# Patient Record
Sex: MALE | Race: OTHER | Employment: FULL TIME | URBAN - METROPOLITAN AREA
[De-identification: names, ages, dates, MRNs, and addresses within clinical notes are randomized per-mention and may not be internally consistent; named-entity substitution may affect disease eponyms.]

---

## 2019-01-15 ENCOUNTER — OFFICE VISIT (OUTPATIENT)
Dept: FAMILY MEDICINE CLINIC | Facility: CLINIC | Age: 34
End: 2019-01-15

## 2019-01-15 VITALS
HEIGHT: 68.6 IN | RESPIRATION RATE: 18 BRPM | BODY MASS INDEX: 28.77 KG/M2 | TEMPERATURE: 98 F | SYSTOLIC BLOOD PRESSURE: 128 MMHG | DIASTOLIC BLOOD PRESSURE: 78 MMHG | HEART RATE: 76 BPM | WEIGHT: 192 LBS

## 2019-01-15 DIAGNOSIS — Z00.00 ROUTINE PHYSICAL EXAMINATION: ICD-10-CM

## 2019-01-15 DIAGNOSIS — J06.9 ACUTE URI: ICD-10-CM

## 2019-01-15 DIAGNOSIS — R09.81 NASAL CONGESTION: Primary | ICD-10-CM

## 2019-01-15 PROCEDURE — 99395 PREV VISIT EST AGE 18-39: CPT | Performed by: FAMILY MEDICINE

## 2019-01-15 RX ORDER — AMOXICILLIN 875 MG/1
875 TABLET, COATED ORAL 2 TIMES DAILY
Qty: 20 TABLET | Refills: 0 | Status: SHIPPED | OUTPATIENT
Start: 2019-01-15 | End: 2019-05-30 | Stop reason: ALTCHOICE

## 2019-01-15 NOTE — PROGRESS NOTES
HPI:    Patient ID: Alan Miranda is a 34year old male. Patient is here for routine physical exam. No acute issues. No significant chronic medical problems. Patient is requesting testing. Diet has been fair and exercise is good. Exercises every day. adenopathy. Psychiatric: He has a normal mood and affect. His behavior is normal. Judgment and thought content normal.   Vitals reviewed. ASSESSMENT/PLAN:   Routine physical examination:  - Exam is unremarkable.  Screening tests were discussed

## 2019-01-19 ENCOUNTER — LAB ENCOUNTER (OUTPATIENT)
Dept: LAB | Age: 34
End: 2019-01-19
Attending: FAMILY MEDICINE
Payer: COMMERCIAL

## 2019-01-19 DIAGNOSIS — Z00.00 ROUTINE PHYSICAL EXAMINATION: ICD-10-CM

## 2019-01-19 LAB
ALBUMIN SERPL BCP-MCNC: 4 G/DL (ref 3.5–4.8)
ALBUMIN/GLOB SERPL: 1.1 {RATIO} (ref 1–2)
ALP SERPL-CCNC: 68 U/L (ref 32–100)
ALT SERPL-CCNC: 39 U/L (ref 17–63)
ANION GAP SERPL CALC-SCNC: 12 MMOL/L (ref 0–18)
AST SERPL-CCNC: 32 U/L (ref 15–41)
BACTERIA UR QL AUTO: NEGATIVE /HPF
BASOPHILS # BLD: 0.1 K/UL (ref 0–0.2)
BASOPHILS NFR BLD: 1 %
BILIRUB SERPL-MCNC: 0.6 MG/DL (ref 0.3–1.2)
BILIRUB UR QL: NEGATIVE
BUN SERPL-MCNC: 14 MG/DL (ref 8–20)
BUN/CREAT SERPL: 15.7 (ref 10–20)
CALCIUM SERPL-MCNC: 9.7 MG/DL (ref 8.5–10.5)
CHLORIDE SERPL-SCNC: 105 MMOL/L (ref 95–110)
CHOLEST SERPL-MCNC: 204 MG/DL (ref 110–200)
CLARITY UR: CLEAR
CO2 SERPL-SCNC: 24 MMOL/L (ref 22–32)
COLOR UR: YELLOW
CREAT SERPL-MCNC: 0.89 MG/DL (ref 0.5–1.5)
EOSINOPHIL # BLD: 0.3 K/UL (ref 0–0.7)
EOSINOPHIL NFR BLD: 3 %
ERYTHROCYTE [DISTWIDTH] IN BLOOD BY AUTOMATED COUNT: 13.9 % (ref 11–15)
GLOBULIN PLAS-MCNC: 3.5 G/DL (ref 2.5–3.7)
GLUCOSE SERPL-MCNC: 87 MG/DL (ref 70–99)
GLUCOSE UR-MCNC: NEGATIVE MG/DL
HCT VFR BLD AUTO: 43.2 % (ref 41–52)
HDLC SERPL-MCNC: 40 MG/DL
HGB BLD-MCNC: 14.8 G/DL (ref 13.5–17.5)
HGB UR QL STRIP.AUTO: NEGATIVE
KETONES UR-MCNC: NEGATIVE MG/DL
LDLC SERPL CALC-MCNC: 151 MG/DL (ref 0–99)
LEUKOCYTE ESTERASE UR QL STRIP.AUTO: NEGATIVE
LYMPHOCYTES # BLD: 2 K/UL (ref 1–4)
LYMPHOCYTES NFR BLD: 19 %
MCH RBC QN AUTO: 29.3 PG (ref 27–32)
MCHC RBC AUTO-ENTMCNC: 34.3 G/DL (ref 32–37)
MCV RBC AUTO: 85.5 FL (ref 80–100)
MONOCYTES # BLD: 0.9 K/UL (ref 0–1)
MONOCYTES NFR BLD: 8 %
NEUTROPHILS # BLD AUTO: 7.3 K/UL (ref 1.8–7.7)
NEUTROPHILS NFR BLD: 69 %
NITRITE UR QL STRIP.AUTO: NEGATIVE
NONHDLC SERPL-MCNC: 164 MG/DL
OSMOLALITY UR CALC.SUM OF ELEC: 292 MOSM/KG (ref 275–295)
PATIENT FASTING: YES
PH UR: 5 [PH] (ref 5–8)
PLATELET # BLD AUTO: 314 K/UL (ref 140–400)
PMV BLD AUTO: 7.8 FL (ref 7.4–10.3)
POTASSIUM SERPL-SCNC: 4.5 MMOL/L (ref 3.3–5.1)
PROT SERPL-MCNC: 7.5 G/DL (ref 5.9–8.4)
PROT UR-MCNC: 30 MG/DL
RBC # BLD AUTO: 5.05 M/UL (ref 4.5–5.9)
RBC #/AREA URNS AUTO: 1 /HPF
SODIUM SERPL-SCNC: 141 MMOL/L (ref 136–144)
SP GR UR STRIP: 1.03 (ref 1–1.03)
TRIGL SERPL-MCNC: 63 MG/DL (ref 1–149)
TSH SERPL-ACNC: 0.75 UIU/ML (ref 0.45–5.33)
UROBILINOGEN UR STRIP-ACNC: <2
VIT C UR-MCNC: NEGATIVE MG/DL
WBC # BLD AUTO: 10.7 K/UL (ref 4–11)
WBC #/AREA URNS AUTO: <1 /HPF

## 2019-01-19 PROCEDURE — 84443 ASSAY THYROID STIM HORMONE: CPT

## 2019-01-19 PROCEDURE — 80061 LIPID PANEL: CPT

## 2019-01-19 PROCEDURE — 85025 COMPLETE CBC W/AUTO DIFF WBC: CPT

## 2019-01-19 PROCEDURE — 36415 COLL VENOUS BLD VENIPUNCTURE: CPT

## 2019-01-19 PROCEDURE — 80053 COMPREHEN METABOLIC PANEL: CPT

## 2019-01-19 PROCEDURE — 81001 URINALYSIS AUTO W/SCOPE: CPT

## 2019-01-22 ENCOUNTER — TELEPHONE (OUTPATIENT)
Dept: FAMILY MEDICINE CLINIC | Facility: CLINIC | Age: 34
End: 2019-01-22

## 2019-01-22 DIAGNOSIS — R82.90 ABNORMAL URINALYSIS: ICD-10-CM

## 2019-01-22 DIAGNOSIS — E78.00 ELEVATED CHOLESTEROL: Primary | ICD-10-CM

## 2019-01-22 NOTE — TELEPHONE ENCOUNTER
Dr Rowan Thompson, please advise. Patient had two concerns about lab results:    1. Why is there protein in the urine? 2. Discussed the lipid panel results. Discussed basics of low-fat, low-cholesterol diet, monitoring his intake if eating out.  Advised on exerc

## 2019-01-22 NOTE — TELEPHONE ENCOUNTER
Message noted; pt can have some protein in urine after exercising recently or with diet if they have been having sig protein in diet recently. Some times protein can be suggestive of issues with the kidneys. His kidney testing was fine with blood work.

## 2019-03-09 ENCOUNTER — OFFICE VISIT (OUTPATIENT)
Dept: OTOLARYNGOLOGY | Facility: CLINIC | Age: 34
End: 2019-03-09

## 2019-03-09 VITALS
SYSTOLIC BLOOD PRESSURE: 135 MMHG | TEMPERATURE: 99 F | WEIGHT: 190 LBS | HEIGHT: 68 IN | DIASTOLIC BLOOD PRESSURE: 86 MMHG | BODY MASS INDEX: 28.79 KG/M2

## 2019-03-09 DIAGNOSIS — J34.2 DEVIATED NASAL SEPTUM: Primary | ICD-10-CM

## 2019-03-09 PROCEDURE — 99212 OFFICE O/P EST SF 10 MIN: CPT | Performed by: OTOLARYNGOLOGY

## 2019-03-09 PROCEDURE — 99243 OFF/OP CNSLTJ NEW/EST LOW 30: CPT | Performed by: OTOLARYNGOLOGY

## 2019-03-09 RX ORDER — MOMETASONE 50 UG/1
2 SPRAY, METERED NASAL DAILY
COMMUNITY
End: 2019-06-20 | Stop reason: ALTCHOICE

## 2019-03-09 RX ORDER — AZELASTINE 1 MG/ML
2 SPRAY, METERED NASAL 2 TIMES DAILY
Qty: 1 BOTTLE | Refills: 0 | Status: SHIPPED | OUTPATIENT
Start: 2019-03-09 | End: 2019-04-05

## 2019-03-09 RX ORDER — MONTELUKAST SODIUM 10 MG/1
10 TABLET ORAL NIGHTLY
Qty: 30 TABLET | Refills: 3 | Status: SHIPPED | OUTPATIENT
Start: 2019-03-09 | End: 2019-06-20 | Stop reason: ALTCHOICE

## 2019-03-09 RX ORDER — LORATADINE 10 MG/1
10 TABLET ORAL DAILY
COMMUNITY
End: 2019-05-30 | Stop reason: ALTCHOICE

## 2019-03-09 NOTE — PROGRESS NOTES
Alan Miranda is a 30year old male.   Patient presents with:  Nose Problem: nasal congestion for at least 10 years, pt had a nasal injury in the past   Snoring: pt is a mouth breather       HISTORY OF PRESENT ILLNESS    He presents with a 10-year history Normal Orientation - Oriented to time, place, person & situation. Appropriate mood and affect.    Neck Exam Normal Inspection - Normal. Palpation - Normal. Parotid gland - Normal. Thyroid gland - Normal.   Eyes Normal Conjunctiva - Right: Normal, Left: Norm obstruction. He does have some mucosal congestion. Start Singulair loratadine D and Astelin nasal spray and return to see me in 1 month. If no better he will most likely require CT scan to evaluate his nasal and paranasal anatomy. Albaro Steven.  [place]

## 2019-04-05 RX ORDER — AZELASTINE HYDROCHLORIDE 137 UG/1
SPRAY, METERED NASAL
Qty: 1 BOTTLE | Refills: 0 | Status: SHIPPED | OUTPATIENT
Start: 2019-04-05 | End: 2019-06-20 | Stop reason: ALTCHOICE

## 2019-04-06 ENCOUNTER — OFFICE VISIT (OUTPATIENT)
Dept: OTOLARYNGOLOGY | Facility: CLINIC | Age: 34
End: 2019-04-06

## 2019-04-06 VITALS
DIASTOLIC BLOOD PRESSURE: 88 MMHG | SYSTOLIC BLOOD PRESSURE: 135 MMHG | BODY MASS INDEX: 29 KG/M2 | TEMPERATURE: 98 F | WEIGHT: 188 LBS

## 2019-04-06 DIAGNOSIS — J34.2 DEVIATED NASAL SEPTUM: Primary | ICD-10-CM

## 2019-04-06 DIAGNOSIS — J34.89 NASAL OBSTRUCTION: ICD-10-CM

## 2019-04-06 PROCEDURE — 99212 OFFICE O/P EST SF 10 MIN: CPT | Performed by: OTOLARYNGOLOGY

## 2019-04-06 PROCEDURE — 99214 OFFICE O/P EST MOD 30 MIN: CPT | Performed by: OTOLARYNGOLOGY

## 2019-04-06 RX ORDER — AZELASTINE 1 MG/ML
2 SPRAY, METERED NASAL 2 TIMES DAILY
Qty: 1 BOTTLE | Refills: 3 | Status: SHIPPED | OUTPATIENT
Start: 2019-04-06 | End: 2019-06-14

## 2019-04-16 ENCOUNTER — HOSPITAL ENCOUNTER (OUTPATIENT)
Dept: CT IMAGING | Facility: HOSPITAL | Age: 34
Discharge: HOME | End: 2019-04-16
Attending: OTOLARYNGOLOGY
Payer: COMMERCIAL

## 2019-04-16 DIAGNOSIS — J34.89 NASAL OBSTRUCTION: ICD-10-CM

## 2019-04-16 PROCEDURE — 70486 CT MAXILLOFACIAL W/O DYE: CPT | Performed by: OTOLARYNGOLOGY

## 2019-04-18 ENCOUNTER — OFFICE VISIT (OUTPATIENT)
Dept: OTOLARYNGOLOGY | Facility: CLINIC | Age: 34
End: 2019-04-18

## 2019-04-18 VITALS
TEMPERATURE: 98 F | DIASTOLIC BLOOD PRESSURE: 90 MMHG | BODY MASS INDEX: 28.49 KG/M2 | WEIGHT: 188 LBS | SYSTOLIC BLOOD PRESSURE: 140 MMHG | HEIGHT: 68 IN

## 2019-04-18 DIAGNOSIS — J34.2 DEVIATED NASAL SEPTUM: Primary | ICD-10-CM

## 2019-04-18 PROCEDURE — 99214 OFFICE O/P EST MOD 30 MIN: CPT | Performed by: OTOLARYNGOLOGY

## 2019-04-18 PROCEDURE — 99212 OFFICE O/P EST SF 10 MIN: CPT | Performed by: OTOLARYNGOLOGY

## 2019-04-18 NOTE — PROGRESS NOTES
Audrey Maria is a 30year old male. Patient presents with:  Results: ct scan done on 4/16/19       HISTORY OF PRESENT ILLNESS  He presents with a 10-year history of chronic nasal obstruction.  He is a chronic mouth breather and states that he snores.    Negative Drooling. Eyes Negative Blurred vision and vision changes. Respiratory Negative Dyspnea and wheezing. Cardio Negative Chest pain, irregular heartbeat/palpitations and syncope. GI Negative Abdominal pain and diarrhea.    Endocrine Negative C hypertrophy left: Mild hypertrophy       Current Outpatient Medications:   •  Azelastine HCl 0.1 % Nasal Solution, 2 sprays by Nasal route 2 (two) times daily. , Disp: 1 Bottle, Rfl: 3  •  AZELASTINE  MCG/SPRAY Nasal Solution, 2 SPRAYS BY NASAL ROUTE

## 2019-04-22 ENCOUNTER — TELEPHONE (OUTPATIENT)
Dept: OTOLARYNGOLOGY | Facility: CLINIC | Age: 34
End: 2019-04-22

## 2019-04-22 NOTE — TELEPHONE ENCOUNTER
Pt calling in regards to deviated septum sx discussed at last office visit. Pt asking about sx schedule and cost of sx.

## 2019-04-22 NOTE — TELEPHONE ENCOUNTER
Pt contacted, scheduled surgery for 5/22/19 with Dr. Melanie Muhammad. Pre-post op instructions reviewed.

## 2019-05-22 ENCOUNTER — TELEPHONE (OUTPATIENT)
Dept: OTOLARYNGOLOGY | Facility: CLINIC | Age: 34
End: 2019-05-22

## 2019-05-22 NOTE — TELEPHONE ENCOUNTER
Received a call from patient's wife asking on how to change patent's mustache dressing, advised patient wife that she can only change it as needed and if saturated 4 dressing within 1 hour to call our office,asking if she can clean patient nose  With dried

## 2019-05-22 NOTE — TELEPHONE ENCOUNTER
Wife wants to know how to clean and change the gauze at the surgery site? Wife states that when she takes the gauze off, it is causing bleeding in the nose area, so she wants to know if there is a better way to change the gauze?  Wife wants to know how can

## 2019-05-23 NOTE — TELEPHONE ENCOUNTER
Pt is post op day 1 septoplasty, SMR. Per  Pt pt is doing well no bleeding, advised pt no bending or heavy lifting for the next week and pt is not to blow nose until seen by VERONICA.  Advised pt she can start using OTC saline nasal spray daily, afrin up to 5 da

## 2019-05-30 ENCOUNTER — OFFICE VISIT (OUTPATIENT)
Dept: OTOLARYNGOLOGY | Facility: CLINIC | Age: 34
End: 2019-05-30

## 2019-05-30 VITALS
SYSTOLIC BLOOD PRESSURE: 106 MMHG | WEIGHT: 190 LBS | HEIGHT: 69 IN | BODY MASS INDEX: 28.14 KG/M2 | DIASTOLIC BLOOD PRESSURE: 70 MMHG | TEMPERATURE: 98 F

## 2019-05-30 DIAGNOSIS — J34.2 DEVIATED NASAL SEPTUM: Primary | ICD-10-CM

## 2019-05-30 PROCEDURE — 99212 OFFICE O/P EST SF 10 MIN: CPT | Performed by: OTOLARYNGOLOGY

## 2019-05-30 PROCEDURE — 99024 POSTOP FOLLOW-UP VISIT: CPT | Performed by: OTOLARYNGOLOGY

## 2019-05-30 RX ORDER — CEPHALEXIN 500 MG/1
CAPSULE ORAL
Refills: 0 | COMMUNITY
Start: 2019-05-22 | End: 2019-06-14

## 2019-05-30 NOTE — PROGRESS NOTES
Calvin Klinefelter is a 30year old male.   Patient presents with:  Post-Op: septoplasty ,patient complains of Migraine and congestion since the procedure       HISTORY OF PRESENT ILLNESS  He presents with a 10-year history of chronic nasal obstruction.  He is No pertinent past medical history. History reviewed. No pertinent surgical history. REVIEW OF SYSTEMS    System Neg/Pos Details   Constitutional Negative Fatigue, fever and weight loss. ENMT Negative Drooling.    Eyes Negative Blurred vision and v Normal Nares - Right: Normal Left: Normal. Septum -Normal  Turbinates - Right: Normal, Left: Normal.       Current Outpatient Medications:   •  cephALEXin 500 MG Oral Cap, TAKE 1 CAPSULE BY MOUTH THREE TIMES A DAY, Disp: , Rfl: 0  •  Azelastine HCl 0.1 % N

## 2019-06-12 ENCOUNTER — TELEPHONE (OUTPATIENT)
Dept: OTOLARYNGOLOGY | Facility: CLINIC | Age: 34
End: 2019-06-12

## 2019-06-12 NOTE — TELEPHONE ENCOUNTER
Pt states he wants to speak to RN re: symptoms since 5/22 sx, only wants to discuss further with RN. Pls call thank you. Aware office is closed for the day.

## 2019-06-13 ENCOUNTER — NURSE TRIAGE (OUTPATIENT)
Dept: FAMILY MEDICINE CLINIC | Facility: CLINIC | Age: 34
End: 2019-06-13

## 2019-06-13 NOTE — TELEPHONE ENCOUNTER
pt informed of your recommendations, states he was taking allegra daily prior to when symptoms started and stopped it because he thought it might cause.  Pt states was searching on the web and asking if his symptoms could be related to empty nose

## 2019-06-13 NOTE — TELEPHONE ENCOUNTER
Action Requested: Summary for Provider     []  Critical Lab, Recommendations Needed  [] Need Additional Advice  []   FYI    []   Need Orders  [] Need Medications Sent to Pharmacy  []  Other     SUMMARY: Appt scheduled for 6/14/19 8am with MILAD MILLER, for conc

## 2019-06-13 NOTE — TELEPHONE ENCOUNTER
Per pt since last Friday, daily, pt feels at times he is gasping for air, causing increased anxiety and panic attacks.  Per [facility], symptoms are unlikely due to surgery, pt to Singulair, Astelin and Claritin D, if symptoms do not improve pt to be follow up wit

## 2019-06-14 ENCOUNTER — TELEPHONE (OUTPATIENT)
Dept: OTOLARYNGOLOGY | Facility: CLINIC | Age: 34
End: 2019-06-14

## 2019-06-14 ENCOUNTER — LAB ENCOUNTER (OUTPATIENT)
Dept: LAB | Age: 34
End: 2019-06-14
Attending: PHYSICIAN ASSISTANT
Payer: COMMERCIAL

## 2019-06-14 ENCOUNTER — HOSPITAL ENCOUNTER (EMERGENCY)
Facility: HOSPITAL | Age: 34
Discharge: HOME | End: 2019-06-14
Attending: PHYSICIAN ASSISTANT
Payer: COMMERCIAL

## 2019-06-14 VITALS
RESPIRATION RATE: 17 BRPM | TEMPERATURE: 99 F | WEIGHT: 185 LBS | SYSTOLIC BLOOD PRESSURE: 132 MMHG | BODY MASS INDEX: 27 KG/M2 | DIASTOLIC BLOOD PRESSURE: 88 MMHG | OXYGEN SATURATION: 97 % | HEART RATE: 76 BPM

## 2019-06-14 DIAGNOSIS — R03.0 ELEVATED BLOOD PRESSURE READING: ICD-10-CM

## 2019-06-14 DIAGNOSIS — R04.0 EPISTAXIS: Primary | ICD-10-CM

## 2019-06-14 DIAGNOSIS — Z98.890 S/P NASAL SEPTOPLASTY: ICD-10-CM

## 2019-06-14 DIAGNOSIS — E78.00 ELEVATED CHOLESTEROL: ICD-10-CM

## 2019-06-14 DIAGNOSIS — R82.90 ABNORMAL URINALYSIS: ICD-10-CM

## 2019-06-14 DIAGNOSIS — F41.9 ANXIETY: ICD-10-CM

## 2019-06-14 PROCEDURE — 82306 VITAMIN D 25 HYDROXY: CPT

## 2019-06-14 PROCEDURE — 85025 COMPLETE CBC W/AUTO DIFF WBC: CPT

## 2019-06-14 PROCEDURE — 80061 LIPID PANEL: CPT

## 2019-06-14 PROCEDURE — 36415 COLL VENOUS BLD VENIPUNCTURE: CPT

## 2019-06-14 PROCEDURE — 84443 ASSAY THYROID STIM HORMONE: CPT

## 2019-06-14 PROCEDURE — 81001 URINALYSIS AUTO W/SCOPE: CPT

## 2019-06-14 PROCEDURE — 99282 EMERGENCY DEPT VISIT SF MDM: CPT

## 2019-06-14 PROCEDURE — 80053 COMPREHEN METABOLIC PANEL: CPT

## 2019-06-14 NOTE — TELEPHONE ENCOUNTER
Dr Octaviano Haddad patient stated  that he had episode of nosebleed  Left nostril profusely today after he blew his nose and was able to stopped the bleeding. Advised patient  To continue saline spray,Vaseline to keep his nose moistand if bleeding will occur again a

## 2019-06-14 NOTE — PROGRESS NOTES
HPI:    Patient ID: Tim Perales is a 30year old male. Patient has been having anxiety attacks for the past 1 week. He was sitting at lunch and felt a jolt of anxiety and his hands got clammy. He has had this uneasy feeling throughout this week.  Jennifer Galan (10 mg total) by mouth nightly.  Disp: 30 tablet Rfl: 3     Allergies:No Known Allergies   /84 (BP Location: Left arm, Patient Position: Sitting)   Pulse 80   Temp 97.8 °F (36.6 °C) (Oral)   Ht 5' 9\" (1.753 m)   Wt 185 lb (83.9 kg)   BMI 27.32 kg/m² worsening symptoms.   -Pt was agreeable to plan and will comply with discussion above. - CBC WITH DIFFERENTIAL WITH PLATELET; Future  - COMP METABOLIC PANEL (14);  Future  - ASSAY, THYROID STIM HORMONE; Future  - VITAMIN D, 25-HYDROXY; Future  - EKG 12-

## 2019-06-14 NOTE — ED NOTES
Pt reports septoplasty on 5/22. Today, patient blew nose and \" a piece of plastic came out of my L nostril and my nose began to bleed. \" Pt states bleeding stopped on its own. No bleeding noted at this time.

## 2019-06-14 NOTE — TELEPHONE ENCOUNTER
Pt states he blew his nose and something came out - then his nose started bleeding profusely - bleeding has stopped now - pls advise

## 2019-06-14 NOTE — ED INITIAL ASSESSMENT (HPI)
Septoplasty on may 22, stated he blew nose and something hard came out and nose began to bleed for 10 minutes.  Bleeding controlled

## 2019-06-14 NOTE — TELEPHONE ENCOUNTER
No this is not empty nose syndrome empty nose syndrome occurs when the turbinates are removed completely. He does not have this and I suspect that his gasping has more to do with anxiety than anything else.   He may return to clinic if he wishes to be reev

## 2019-06-14 NOTE — ED PROVIDER NOTES
Patient Seen in: [facility] Emergency Department    History   Patient presents with:  Nose Bleed (nasopharyngeal)    Stated Complaint: septoplasty on 5/22/2019, blew nose today and blood and \"something hard came ou*    HPI    42-year-old male pre except as otherwise stated in HPI.     Physical Exam     ED Triage Vitals [06/14/19 1552]   /84   Pulse 67   Resp 18   Temp 98.8 °F (37.1 °C)   Temp src Oral   SpO2 100 %   O2 Device None (Room air)       Current:/88   Pulse 76   Temp 98.8 °F (3 discussed with patient. The patient was informed of their elevated blood pressure reading in the Emergency Department. They were informed of the dangers of undiagnosed and untreated hypertension.   Education regarding lifestyle modifications and the nee

## 2019-06-15 ENCOUNTER — TELEPHONE (OUTPATIENT)
Dept: FAMILY MEDICINE CLINIC | Facility: CLINIC | Age: 34
End: 2019-06-15

## 2019-06-15 NOTE — TELEPHONE ENCOUNTER
He most likely blew out a scab from his nose. Most likely from the inferior turbinate on the side that bled.   At this point he should be careful with blowing his nose so hard and any further scabs will simply follow out if he continues to use saline and V

## 2019-06-15 NOTE — TELEPHONE ENCOUNTER
Pt called in stating that during his OV yesterday with PAUL Bergman they discussed medications for anxiety and she was going to prescribe. Pt states that he has not received medication and is currently still having sx. Please advise.

## 2019-06-15 NOTE — TELEPHONE ENCOUNTER
Spoke with pt and he states he had an impression yesterday [facility] (low dose) to try for two weeks. Pt would like to start taking the medication since he still 'feeling the same with anxiety\". Chart reviewed and noted no RX sent and Maria Luisa Pino is not available at this time. Dr Prakash Fisher, please advise.

## 2019-06-15 NOTE — TELEPHONE ENCOUNTER
Message noted. I have called pt and left a message that I called in the prescription for xanax 0.5 mg once daily as needed for anxiety. He was given 15 pills with 0 refills. Called prescription to [facility] pharmacy on [id number] in [place]. I advised pt to call pharmacy in 1 hr to see if the prescription will be ready. Told pharmacist to notify pt as well when the prescription is ready.

## 2019-06-17 NOTE — TELEPHONE ENCOUNTER
Rn spoke to patient stated he is doing fine no bleeding,advised pt of note below pt verbalized  Understanding.

## 2019-06-18 ENCOUNTER — TELEPHONE (OUTPATIENT)
Dept: FAMILY MEDICINE CLINIC | Facility: CLINIC | Age: 34
End: 2019-06-18

## 2019-06-18 DIAGNOSIS — R31.1 BENIGN ESSENTIAL MICROSCOPIC HEMATURIA: Primary | ICD-10-CM

## 2019-06-20 ENCOUNTER — TELEPHONE (OUTPATIENT)
Dept: FAMILY MEDICINE CLINIC | Facility: CLINIC | Age: 34
End: 2019-06-20

## 2019-06-20 RX ORDER — ALPRAZOLAM 0.5 MG/1
0.5 TABLET ORAL DAILY PRN
Qty: 15 TABLET | Refills: 0 | COMMUNITY
Start: 2019-06-20

## 2019-06-20 NOTE — PROGRESS NOTES
HPI:    Patient ID: Venecia Morrison is a 30year old male. Patient presents for follow-up on his anxiety. He has been taking the Xanax 1/2 tabs of 0.5 mg as needed and has provided some relief.  Yesterday he felt a sense of sadness and was on verge of te rhythm and normal heart sounds. Pulmonary/Chest: Effort normal and breath sounds normal. He has no wheezes. He has no rales. Neurological: He is alert and oriented to person, place, and time.    Psychiatric: His behavior is normal. Judgment and thought

## 2019-06-20 NOTE — TELEPHONE ENCOUNTER
Spoke with patient and pt is getting worse.  After discussion, will have pt see SHADY Baugh today at 4:45pm.

## 2019-06-20 NOTE — TELEPHONE ENCOUNTER
Called spoke with pt in regards to message notified pt could come in today will have on schedule pt states will be on way right now

## 2019-06-20 NOTE — PATIENT INSTRUCTIONS
Take zoloft daily 25 mg   If you want to stop the medicaiton then call and let me know so we can wean you off the medication. You will notice a difference in 2 weeks, and then a full effect in 6-8 weeks.      Counseling with Judy Pride

## 2019-06-20 NOTE — TELEPHONE ENCOUNTER
Pt states [facility] had called him on 6/18/19 and he discussed his anxiety with him.  Pt states had been feeling better but yesterday starting feeling anxiety with the SOB again and when got home from work started feeling an overwhelming feeling of sadness that h

## 2019-06-21 ENCOUNTER — TELEPHONE (OUTPATIENT)
Dept: OTOLARYNGOLOGY | Facility: CLINIC | Age: 34
End: 2019-06-21

## 2019-06-21 NOTE — TELEPHONE ENCOUNTER
Pt had surgery over 1 month ago, advised pharmacy that [facility] prescriptions cannot be filled as it was ment for pt after surgery. Per pharmacy they will not fill rx for tramadol and will inform pt he will need to contact our office.

## 2019-06-21 NOTE — TELEPHONE ENCOUNTER
Pharmacy calling states pt was precribed tramadol by sx center and printed rx is missing the strength. Pharmacy states they called sx center and was instructed to contact . Pls advise, thank you.

## 2019-06-26 RX ORDER — ALPRAZOLAM 0.5 MG/1
0.5 TABLET ORAL DAILY PRN
Qty: 15 TABLET | Refills: 0 | OUTPATIENT
Start: 2019-06-26

## 2019-06-26 NOTE — TELEPHONE ENCOUNTER
Refill noted. Patient needs to see Michael Gould before he can further refills. Seems like he is going through the medication too fast. Ensure that the zoloft is being taken daily.

## 2019-06-26 NOTE — TELEPHONE ENCOUNTER
Called [facility] pharmacy, spoke with Jae Valera, patient has no refills on file and it is 4 days too  early to be filled again     Med pended for your approval, needs to be called in

## 2019-06-27 NOTE — TELEPHONE ENCOUNTER
Message noted. It might be a side effect of the medication or worsening of his anxiety. Patient needs to keep appointment with Brennan Gonzalez. She will discuss these issues with him further tomorrow.

## 2019-06-27 NOTE — TELEPHONE ENCOUNTER
Rx phoned in. Informed pt Chapin MILLER Instructions. Pt verbalized understanding. Pt report feeling cloudiness and little confusion. States continue with anxiousness.   Pt states he has been taking sertraline x 5 days and would like to know if this is a si

## 2019-07-11 ENCOUNTER — OFFICE VISIT (OUTPATIENT)
Dept: OTOLARYNGOLOGY | Facility: CLINIC | Age: 34
End: 2019-07-11

## 2019-07-11 VITALS
HEIGHT: 69 IN | DIASTOLIC BLOOD PRESSURE: 87 MMHG | BODY MASS INDEX: 25.92 KG/M2 | WEIGHT: 175 LBS | TEMPERATURE: 99 F | SYSTOLIC BLOOD PRESSURE: 126 MMHG

## 2019-07-11 DIAGNOSIS — J34.89 NASAL OBSTRUCTION: Primary | ICD-10-CM

## 2019-07-11 PROCEDURE — 99024 POSTOP FOLLOW-UP VISIT: CPT | Performed by: OTOLARYNGOLOGY

## 2019-07-12 NOTE — PROGRESS NOTES
Maria Alejandra Mcginnis is a 30year old male. Patient presents with:   Follow - Up: pt here for a follw up on septoplasty done on 5/22/19,  pt states a little nasal congestion , not able to sleep       HISTORY OF PRESENT ILLNESS  He presents with a 10-year histor sensation of not being able to breathe.   He states that since that time that each day seems worse and that he feels that he cannot breathe through his nose and developed shortness of breath and states that this is not necessarily ameliorated by opening his Constitutional Negative Fatigue, fever and weight loss. ENMT Negative Drooling. Eyes Negative Blurred vision and vision changes. Respiratory Negative Dyspnea and wheezing. Cardio Negative Chest pain, irregular heartbeat/palpitations and syncope. - Right: Normal, Left: Normal.  Normal mucosa noted throughout. Some dryness to the mucosa primarily on the left side.        Current Outpatient Medications:   •  ALPRAZolam (XANAX) 0.5 MG Oral Tab, Take 1 tablet (0.5 mg total) by mouth daily as needed for months. He states understanding          Robert Barber MD    7/11/2019    7:26 PM

## 2019-07-18 NOTE — TELEPHONE ENCOUNTER
Refill Protocol Appointment Criteria  · Appointment scheduled in the past 6 months or in the next 3 months  Recent Outpatient Visits            1 week ago Nasal obstruction    [facility], [address], MD Yuri Couch

## 2019-07-18 NOTE — TELEPHONE ENCOUNTER
Please advise to refill request.  This is a new med started 6/20/19 and pt was instructed to f/u in one month. No f/u noted.

## 2019-07-18 NOTE — TELEPHONE ENCOUNTER
Message noted. Once he creates a follow-up appointment he can get his refill. He needs to be seen in office for his refills.

## 2019-07-25 RX ORDER — SERTRALINE HYDROCHLORIDE 25 MG/1
25 TABLET, FILM COATED ORAL DAILY
Qty: 90 TABLET | Refills: 1 | OUTPATIENT
Start: 2019-07-25

## 2019-07-25 NOTE — TELEPHONE ENCOUNTER
Pt returning call and states he already has just got the refill ( 28 tabs left ) but he wants to know if he needs to follow up with Dr Atiya Troy or psychiatrist. He reports he was seen by a Psychiatrist from [facility] 2 weeks ago.  He was told to ca

## 2019-08-17 RX ORDER — SERTRALINE HYDROCHLORIDE 25 MG/1
TABLET, FILM COATED ORAL
Qty: 90 TABLET | Refills: 1 | Status: SHIPPED | OUTPATIENT
Start: 2019-08-17

## 2019-08-18 NOTE — TELEPHONE ENCOUNTER
CSS=please call and assists with FU OV, no schedule yet. MyChart message sent. LOV 6/20/19  Instructions         Return in about 1 month (around 7/20/2019). Refill passed per [facility] protocol.     Refill Protocol Appointment Criteria  · Ap

## 2019-08-21 NOTE — TELEPHONE ENCOUNTER
Pt was informed, pt states he is seeing a psychiatrist and refills were given there. Pt also states his does was changed to 50MG. . inform pt he will still need to f/u with  To inform him of changes

## 2020-11-18 ENCOUNTER — OFFICE VISIT (OUTPATIENT)
Dept: FAMILY MEDICINE CLINIC | Facility: CLINIC | Age: 35
End: 2020-11-18

## 2020-11-18 VITALS
DIASTOLIC BLOOD PRESSURE: 72 MMHG | SYSTOLIC BLOOD PRESSURE: 112 MMHG | RESPIRATION RATE: 20 BRPM | TEMPERATURE: 97 F | BODY MASS INDEX: 28.17 KG/M2 | WEIGHT: 188 LBS | HEIGHT: 68.7 IN | HEART RATE: 70 BPM

## 2020-11-18 DIAGNOSIS — Z00.00 ROUTINE PHYSICAL EXAMINATION: Primary | ICD-10-CM

## 2020-11-18 PROCEDURE — 3074F SYST BP LT 130 MM HG: CPT | Performed by: FAMILY MEDICINE

## 2020-11-18 PROCEDURE — 90471 IMMUNIZATION ADMIN: CPT | Performed by: FAMILY MEDICINE

## 2020-11-18 PROCEDURE — 90686 IIV4 VACC NO PRSV 0.5 ML IM: CPT | Performed by: FAMILY MEDICINE

## 2020-11-18 PROCEDURE — 99395 PREV VISIT EST AGE 18-39: CPT | Performed by: FAMILY MEDICINE

## 2020-11-18 PROCEDURE — 3008F BODY MASS INDEX DOCD: CPT | Performed by: FAMILY MEDICINE

## 2020-11-18 PROCEDURE — 3078F DIAST BP <80 MM HG: CPT | Performed by: FAMILY MEDICINE

## 2020-11-18 NOTE — PROGRESS NOTES
HPI:    Patient ID: Ayaka Donnelly is a 37year old male. Patient is here for routine physical exam. No acute issues. No significant chronic medical problems. Patient is requesting testing. Diet and exercise have been fairly good during pandemic.  Past motion. Neck supple. No thyromegaly present. Cardiovascular: Normal rate, regular rhythm, normal heart sounds and intact distal pulses. Pulmonary/Chest: Effort normal and breath sounds normal. No respiratory distress. He has no wheezes.    Abdominal: So

## 2020-11-20 ENCOUNTER — LAB ENCOUNTER (OUTPATIENT)
Dept: LAB | Age: 35
End: 2020-11-20
Attending: FAMILY MEDICINE
Payer: COMMERCIAL

## 2020-11-20 DIAGNOSIS — Z00.00 ROUTINE PHYSICAL EXAMINATION: ICD-10-CM

## 2020-11-20 PROCEDURE — 84443 ASSAY THYROID STIM HORMONE: CPT

## 2020-11-20 PROCEDURE — 85027 COMPLETE CBC AUTOMATED: CPT

## 2020-11-20 PROCEDURE — 80061 LIPID PANEL: CPT

## 2020-11-20 PROCEDURE — 36415 COLL VENOUS BLD VENIPUNCTURE: CPT

## 2020-11-20 PROCEDURE — 80053 COMPREHEN METABOLIC PANEL: CPT

## 2021-04-09 DIAGNOSIS — Z23 NEED FOR VACCINATION: ICD-10-CM

## 2021-04-13 ENCOUNTER — IMMUNIZATION (OUTPATIENT)
Dept: LAB | Age: 36
End: 2021-04-13
Attending: HOSPITALIST
Payer: COMMERCIAL

## 2021-04-13 DIAGNOSIS — Z23 NEED FOR VACCINATION: Primary | ICD-10-CM

## 2021-04-13 PROCEDURE — 0001A SARSCOV2 VAC 30MCG/0.3ML IM: CPT

## 2021-05-04 ENCOUNTER — IMMUNIZATION (OUTPATIENT)
Dept: LAB | Age: 36
End: 2021-05-04
Attending: HOSPITALIST
Payer: COMMERCIAL

## 2021-05-04 DIAGNOSIS — Z23 NEED FOR VACCINATION: Primary | ICD-10-CM

## 2021-05-04 PROCEDURE — 0002A SARSCOV2 VAC 30MCG/0.3ML IM: CPT

## 2021-06-07 ENCOUNTER — NURSE TRIAGE (OUTPATIENT)
Dept: FAMILY MEDICINE CLINIC | Facility: CLINIC | Age: 36
End: 2021-06-07

## 2021-06-07 ENCOUNTER — HOSPITAL ENCOUNTER (OUTPATIENT)
Age: 36
Discharge: HOME | End: 2021-06-07
Payer: COMMERCIAL

## 2021-06-07 VITALS
OXYGEN SATURATION: 100 % | HEART RATE: 65 BPM | BODY MASS INDEX: 27.4 KG/M2 | SYSTOLIC BLOOD PRESSURE: 139 MMHG | DIASTOLIC BLOOD PRESSURE: 97 MMHG | RESPIRATION RATE: 18 BRPM | WEIGHT: 185 LBS | TEMPERATURE: 98 F | HEIGHT: 69 IN

## 2021-06-07 DIAGNOSIS — J02.0 STREPTOCOCCAL SORE THROAT: Primary | ICD-10-CM

## 2021-06-07 PROCEDURE — 87880 STREP A ASSAY W/OPTIC: CPT | Performed by: NURSE PRACTITIONER

## 2021-06-07 PROCEDURE — 99213 OFFICE O/P EST LOW 20 MIN: CPT | Performed by: NURSE PRACTITIONER

## 2021-06-07 RX ORDER — AMOXICILLIN 875 MG/1
875 TABLET, COATED ORAL 2 TIMES DAILY
Qty: 20 TABLET | Refills: 0 | Status: SHIPPED | OUTPATIENT
Start: 2021-06-07 | End: 2021-06-17

## 2021-06-07 NOTE — ED PROVIDER NOTES
Patient presents with:  Ear Pain      HPI:     Ayaka Donnelly is a 31year old male who presents for a sore throat that started a couple days ago. He states he had some upper respiratory congestion prior to the sore throat.   His kids have all had congest Communication with Friends and Family:       Frequency of Social Gatherings with Friends and Family:       Attends Christianity Services:       Active Member of Clubs or Organizations:       Attends Club or Organization Meetings:       Marital Status:   Intim doctor. Diagnosis:    ICD-10-CM    1. Streptococcal sore throat  J02.0        All results reviewed and discussed with patient. See AVS for detailed discharge instructions for your condition today. Follow Up with:  Serena Brooke MD  [address]

## 2021-06-07 NOTE — TELEPHONE ENCOUNTER
Action Requested: Summary for Provider     []  Critical Lab, Recommendations Needed  [] Need Additional Advice  [x]   FYI    []   Need Orders  [] Need Medications Sent to Pharmacy  []  Other     SUMMARY: Patient states for past 2 days he has been having a

## 2021-08-14 ENCOUNTER — HOSPITAL ENCOUNTER (OUTPATIENT)
Age: 36
Discharge: HOME | End: 2021-08-14
Payer: COMMERCIAL

## 2021-08-14 VITALS
WEIGHT: 190 LBS | HEART RATE: 74 BPM | SYSTOLIC BLOOD PRESSURE: 130 MMHG | HEIGHT: 69 IN | BODY MASS INDEX: 28.14 KG/M2 | DIASTOLIC BLOOD PRESSURE: 81 MMHG | OXYGEN SATURATION: 99 % | RESPIRATION RATE: 16 BRPM | TEMPERATURE: 97 F

## 2021-08-14 DIAGNOSIS — Z20.822 ENCOUNTER FOR LABORATORY TESTING FOR COVID-19 VIRUS: Primary | ICD-10-CM

## 2021-08-14 LAB — SARS-COV-2 RNA RESP QL NAA+PROBE: NOT DETECTED

## 2021-08-14 PROCEDURE — U0002 COVID-19 LAB TEST NON-CDC: HCPCS | Performed by: NURSE PRACTITIONER

## 2021-08-14 PROCEDURE — 99212 OFFICE O/P EST SF 10 MIN: CPT | Performed by: NURSE PRACTITIONER

## 2021-08-14 NOTE — ED PROVIDER NOTES
Patient Seen in: [facility]      History   Patient presents with:  Covid-19 Test    Stated Complaint: exposed/no symptoms    HPI/Subjective:   HPI    Healthy 30 yo male arrives to the ic with concern for covid due to + exposure, no complaints or respiratory distress. Musculoskeletal:         General: Normal range of motion. Cervical back: Normal range of motion and neck supple. Skin:     General: Skin is warm. Findings: No rash.    Neurological:      General: No focal deficit prese 10 days from date of exposure                             Disposition and Plan     Clinical Impression:  Encounter for laboratory testing for COVID-19 virus  (primary encounter diagnosis)     Disposition:  Discharge  8/14/2021  9:44 am    Follow-up:  No fo

## 2021-10-11 ENCOUNTER — HOSPITAL ENCOUNTER (OUTPATIENT)
Age: 36
Discharge: HOME | End: 2021-10-11
Attending: PHYSICIAN ASSISTANT
Payer: COMMERCIAL

## 2021-10-11 VITALS
BODY MASS INDEX: 27.2 KG/M2 | SYSTOLIC BLOOD PRESSURE: 134 MMHG | RESPIRATION RATE: 18 BRPM | DIASTOLIC BLOOD PRESSURE: 91 MMHG | HEART RATE: 80 BPM | WEIGHT: 190 LBS | OXYGEN SATURATION: 99 % | TEMPERATURE: 98 F | HEIGHT: 70 IN

## 2021-10-11 DIAGNOSIS — J02.9 ACUTE VIRAL PHARYNGITIS: ICD-10-CM

## 2021-10-11 DIAGNOSIS — R05.9 COUGH: Primary | ICD-10-CM

## 2021-10-11 DIAGNOSIS — R03.0 ELEVATED BLOOD PRESSURE READING: ICD-10-CM

## 2021-10-11 DIAGNOSIS — Z20.822 ENCOUNTER FOR LABORATORY TESTING FOR COVID-19 VIRUS: ICD-10-CM

## 2021-10-11 PROCEDURE — U0002 COVID-19 LAB TEST NON-CDC: HCPCS | Performed by: PHYSICIAN ASSISTANT

## 2021-10-11 PROCEDURE — 99213 OFFICE O/P EST LOW 20 MIN: CPT | Performed by: PHYSICIAN ASSISTANT

## 2021-10-11 PROCEDURE — 87880 STREP A ASSAY W/OPTIC: CPT | Performed by: PHYSICIAN ASSISTANT

## 2021-10-11 NOTE — ED PROVIDER NOTES
Patient Seen in: [facility]    History   Patient presents with:  Cough/URI  Sore Throat  Covid-19 Test    Stated Complaint: Shantanu Milligan    HPI    71-year-old male presents with chief complaint of cough. Onset yesterday.   Patient report Current:BP (!) 134/91   Pulse 80   Temp 97.8 °F (36.6 °C) (Temporal)   Resp 18   Ht 177.8 cm (5' 10\")   Wt 86.2 kg   SpO2 99%   BMI 27.26 kg/m²     PULSE OX within normal limits on room air as interpreted by this provider.     Constitutional: The pat diagnoses, expectations, follow up, and ER precautions. I explained to the patient that emergent conditions may arise and to go to the ER for new, worsening or any persistent conditions.  I've explained the importance of following up with their doctor as in

## 2021-10-20 NOTE — PROGRESS NOTES
Tristan Boone is a 30year old male. Patient presents with: Follow - Up: 1 month visit regarding deviated nasal septum, slightly improved, take medications as recommended.       HISTORY OF PRESENT ILLNESS  He presents with a 10-year history of chronic n Negative Frequent skin infections, pigment change and rash. Hema/Lymph Negative Easy bleeding and easy bruising.            PHYSICAL EXAM    /88 (BP Location: Right arm, Patient Position: Sitting, Cuff Size: large)   Temp 97.6 °F (36.4 °C) (Tympanic Furoate 50 MCG/ACT Nasal Suspension, 2 sprays by Nasal route daily. , Disp: , Rfl:   •  Loratadine-Pseudoephedrine ER 5-120 MG Oral Tablet 12 Hr, Take 1 tablet by mouth every 12 (twelve) hours. , Disp: 60 tablet, Rfl: 3  •  amoxicillin 875 MG Oral Tab, Take Benzoyl Peroxide Counseling: Patient counseled that medicine may cause skin irritation and bleach clothing.  In the event of skin irritation, the patient was advised to reduce the amount of the drug applied or use it less frequently.   The patient verbalized understanding of the proper use and possible adverse effects of benzoyl peroxide.  All of the patient's questions and concerns were addressed.

## 2021-12-03 ENCOUNTER — HOSPITAL ENCOUNTER (OUTPATIENT)
Age: 36
Discharge: HOME | End: 2021-12-03
Payer: COMMERCIAL

## 2021-12-03 VITALS
TEMPERATURE: 98 F | RESPIRATION RATE: 16 BRPM | HEART RATE: 80 BPM | SYSTOLIC BLOOD PRESSURE: 148 MMHG | OXYGEN SATURATION: 100 % | DIASTOLIC BLOOD PRESSURE: 81 MMHG

## 2021-12-03 DIAGNOSIS — Z20.822 ENCOUNTER FOR LABORATORY TESTING FOR COVID-19 VIRUS: Primary | ICD-10-CM

## 2021-12-03 DIAGNOSIS — J06.9 UPPER RESPIRATORY TRACT INFECTION, UNSPECIFIED TYPE: ICD-10-CM

## 2021-12-03 PROCEDURE — U0002 COVID-19 LAB TEST NON-CDC: HCPCS | Performed by: NURSE PRACTITIONER

## 2021-12-03 PROCEDURE — 99213 OFFICE O/P EST LOW 20 MIN: CPT | Performed by: NURSE PRACTITIONER

## 2021-12-03 PROCEDURE — 87880 STREP A ASSAY W/OPTIC: CPT | Performed by: NURSE PRACTITIONER

## 2021-12-03 NOTE — ED PROVIDER NOTES
Patient Seen in: [facility]      History   Patient presents with:  Cough/URI    Stated Complaint: sinus/ear inf    Subjective:   Patient presents to the immediate care accompanied by self.   Patient reports this week he developed nasal congest above.    Physical Exam     ED Triage Vitals [12/03/21 1407]   /81   Pulse 80   Resp 16   Temp 98 °F (36.7 °C)   Temp src    SpO2 100 %   O2 Device None (Room air)       Current:/81   Pulse 80   Temp 98 °F (36.7 °C)   Resp 16   SpO2 100% oriented to person, place, and time. Mental status is at baseline. Psychiatric:         Mood and Affect: Mood normal.         Behavior: Behavior normal.         Thought Content:  Thought content normal.         Judgment: Judgment normal.               ED Disposition:  Discharge  12/3/2021  2:52 pm    Follow-up:  No follow-up provider specified.         Medications Prescribed:  Current Discharge Medication List

## 2022-10-31 ENCOUNTER — HOSPITAL ENCOUNTER (OUTPATIENT)
Age: 37
Discharge: HOME | End: 2022-10-31
Payer: COMMERCIAL

## 2022-10-31 VITALS
RESPIRATION RATE: 18 BRPM | TEMPERATURE: 98 F | DIASTOLIC BLOOD PRESSURE: 95 MMHG | WEIGHT: 195 LBS | HEIGHT: 70 IN | BODY MASS INDEX: 27.92 KG/M2 | HEART RATE: 89 BPM | OXYGEN SATURATION: 100 % | SYSTOLIC BLOOD PRESSURE: 146 MMHG

## 2022-10-31 DIAGNOSIS — J01.00 ACUTE NON-RECURRENT MAXILLARY SINUSITIS: Primary | ICD-10-CM

## 2022-10-31 PROCEDURE — 99213 OFFICE O/P EST LOW 20 MIN: CPT | Performed by: NURSE PRACTITIONER

## 2022-10-31 RX ORDER — AMOXICILLIN AND CLAVULANATE POTASSIUM 875; 125 MG/1; MG/1
1 TABLET, FILM COATED ORAL 2 TIMES DAILY
Qty: 14 TABLET | Refills: 0 | Status: SHIPPED | OUTPATIENT
Start: 2022-10-31 | End: 2022-11-07

## 2022-10-31 RX ORDER — PSEUDOEPHEDRINE HCL 120 MG/1
120 TABLET, FILM COATED, EXTENDED RELEASE ORAL EVERY 12 HOURS PRN
Qty: 10 TABLET | Refills: 0 | Status: SHIPPED | OUTPATIENT
Start: 2022-10-31 | End: 2022-11-30

## 2022-10-31 RX ORDER — TRIAMCINOLONE ACETONIDE 55 UG/1
1 SPRAY, METERED NASAL 2 TIMES DAILY
Qty: 10.8 ML | Refills: 0 | Status: SHIPPED | OUTPATIENT
Start: 2022-10-31

## 2022-11-15 ENCOUNTER — OFFICE VISIT (OUTPATIENT)
Dept: FAMILY MEDICINE CLINIC | Facility: CLINIC | Age: 37
End: 2022-11-15
Payer: COMMERCIAL

## 2022-11-15 VITALS
DIASTOLIC BLOOD PRESSURE: 78 MMHG | HEART RATE: 71 BPM | RESPIRATION RATE: 16 BRPM | HEIGHT: 68.8 IN | WEIGHT: 194 LBS | BODY MASS INDEX: 28.73 KG/M2 | SYSTOLIC BLOOD PRESSURE: 123 MMHG

## 2022-11-15 DIAGNOSIS — Z00.00 ROUTINE PHYSICAL EXAMINATION: Primary | ICD-10-CM

## 2022-11-15 DIAGNOSIS — Z11.3 ROUTINE SCREENING FOR STI (SEXUALLY TRANSMITTED INFECTION): ICD-10-CM

## 2022-11-15 PROCEDURE — 3078F DIAST BP <80 MM HG: CPT | Performed by: FAMILY MEDICINE

## 2022-11-15 PROCEDURE — 3008F BODY MASS INDEX DOCD: CPT | Performed by: FAMILY MEDICINE

## 2022-11-15 PROCEDURE — 90686 IIV4 VACC NO PRSV 0.5 ML IM: CPT | Performed by: FAMILY MEDICINE

## 2022-11-15 PROCEDURE — 90471 IMMUNIZATION ADMIN: CPT | Performed by: FAMILY MEDICINE

## 2022-11-15 PROCEDURE — 99395 PREV VISIT EST AGE 18-39: CPT | Performed by: FAMILY MEDICINE

## 2022-11-15 PROCEDURE — 3074F SYST BP LT 130 MM HG: CPT | Performed by: FAMILY MEDICINE

## 2022-11-23 ENCOUNTER — LAB ENCOUNTER (OUTPATIENT)
Dept: LAB | Age: 37
End: 2022-11-23
Attending: FAMILY MEDICINE
Payer: COMMERCIAL

## 2022-11-23 DIAGNOSIS — Z11.3 ROUTINE SCREENING FOR STI (SEXUALLY TRANSMITTED INFECTION): ICD-10-CM

## 2022-11-23 DIAGNOSIS — Z00.00 ROUTINE PHYSICAL EXAMINATION: ICD-10-CM

## 2022-11-23 LAB
ALBUMIN SERPL-MCNC: 3.8 G/DL (ref 3.4–5)
ALBUMIN/GLOB SERPL: 1 {RATIO} (ref 1–2)
ALP LIVER SERPL-CCNC: 111 U/L
ALT SERPL-CCNC: 37 U/L
ANION GAP SERPL CALC-SCNC: 6 MMOL/L (ref 0–18)
AST SERPL-CCNC: 19 U/L (ref 15–37)
BILIRUB SERPL-MCNC: 0.4 MG/DL (ref 0.1–2)
BUN BLD-MCNC: 15 MG/DL (ref 7–18)
BUN/CREAT SERPL: 17.2 (ref 10–20)
CALCIUM BLD-MCNC: 9.4 MG/DL (ref 8.5–10.1)
CHLORIDE SERPL-SCNC: 105 MMOL/L (ref 98–112)
CHOLEST SERPL-MCNC: 201 MG/DL (ref ?–200)
CO2 SERPL-SCNC: 27 MMOL/L (ref 21–32)
CREAT BLD-MCNC: 0.87 MG/DL
DEPRECATED RDW RBC AUTO: 42.3 FL (ref 35.1–46.3)
ERYTHROCYTE [DISTWIDTH] IN BLOOD BY AUTOMATED COUNT: 13.4 % (ref 11–15)
FASTING PATIENT LIPID ANSWER: YES
FASTING STATUS PATIENT QL REPORTED: YES
GFR SERPLBLD BASED ON 1.73 SQ M-ARVRAT: 115 ML/MIN/1.73M2 (ref 60–?)
GLOBULIN PLAS-MCNC: 3.9 G/DL (ref 2.8–4.4)
GLUCOSE BLD-MCNC: 82 MG/DL (ref 70–99)
HAV IGM SER QL: NONREACTIVE
HBV CORE IGM SER QL: NONREACTIVE
HBV SURFACE AG SERPL QL IA: NONREACTIVE
HCT VFR BLD AUTO: 43.6 %
HCV AB SERPL QL IA: NONREACTIVE
HDLC SERPL-MCNC: 46 MG/DL (ref 40–59)
HGB BLD-MCNC: 14.3 G/DL
LDLC SERPL CALC-MCNC: 135 MG/DL (ref ?–100)
MCH RBC QN AUTO: 28.1 PG (ref 26–34)
MCHC RBC AUTO-ENTMCNC: 32.8 G/DL (ref 31–37)
MCV RBC AUTO: 85.8 FL
NONHDLC SERPL-MCNC: 155 MG/DL (ref ?–130)
OSMOLALITY SERPL CALC.SUM OF ELEC: 286 MOSM/KG (ref 275–295)
PLATELET # BLD AUTO: 346 10(3)UL (ref 150–450)
POTASSIUM SERPL-SCNC: 4.3 MMOL/L (ref 3.5–5.1)
PROT SERPL-MCNC: 7.7 G/DL (ref 6.4–8.2)
RBC # BLD AUTO: 5.08 X10(6)UL
SODIUM SERPL-SCNC: 138 MMOL/L (ref 136–145)
TRIGL SERPL-MCNC: 109 MG/DL (ref 30–149)
TSI SER-ACNC: 0.66 MIU/ML (ref 0.36–3.74)
VLDLC SERPL CALC-MCNC: 20 MG/DL (ref 0–30)
WBC # BLD AUTO: 8.7 X10(3) UL (ref 4–11)

## 2022-11-23 PROCEDURE — 86780 TREPONEMA PALLIDUM: CPT

## 2022-11-23 PROCEDURE — 36415 COLL VENOUS BLD VENIPUNCTURE: CPT

## 2022-11-23 PROCEDURE — 85027 COMPLETE CBC AUTOMATED: CPT

## 2022-11-23 PROCEDURE — 80061 LIPID PANEL: CPT

## 2022-11-23 PROCEDURE — 87591 N.GONORRHOEAE DNA AMP PROB: CPT

## 2022-11-23 PROCEDURE — 87491 CHLMYD TRACH DNA AMP PROBE: CPT

## 2022-11-23 PROCEDURE — 86696 HERPES SIMPLEX TYPE 2 TEST: CPT

## 2022-11-23 PROCEDURE — 86695 HERPES SIMPLEX TYPE 1 TEST: CPT

## 2022-11-23 PROCEDURE — 84443 ASSAY THYROID STIM HORMONE: CPT

## 2022-11-23 PROCEDURE — 80053 COMPREHEN METABOLIC PANEL: CPT

## 2022-11-23 PROCEDURE — 87389 HIV-1 AG W/HIV-1&-2 AB AG IA: CPT

## 2022-11-23 PROCEDURE — 80074 ACUTE HEPATITIS PANEL: CPT

## 2022-11-25 ENCOUNTER — TELEPHONE (OUTPATIENT)
Dept: FAMILY MEDICINE CLINIC | Facility: CLINIC | Age: 37
End: 2022-11-25

## 2022-11-25 LAB
C TRACH DNA SPEC QL NAA+PROBE: NEGATIVE
HSV 1 GLYCOPROTEIN G, IGG: POSITIVE
HSV 2 GLYCOPROTEIN G, IGG: POSITIVE
N GONORRHOEA DNA SPEC QL NAA+PROBE: NEGATIVE
T PALLIDUM AB SER QL: NEGATIVE

## 2022-11-25 NOTE — TELEPHONE ENCOUNTER
Yamilteh Mahan pt called and he stated that he just received 2 new test results today. I noted it was the HSV result. Pt stated that he has never had a cold sore or has had any lesion in his genital area. Pt will like to discuss this test result with you.  Pt aware you will be back in the office on Monday

## 2022-11-26 NOTE — TELEPHONE ENCOUNTER
Message noted. It appears that he has been exposed to the herpes virus for both type 1 & 2 unfortunately. Will discuss with him when back in office on Monday.

## 2022-11-28 ENCOUNTER — PATIENT MESSAGE (OUTPATIENT)
Dept: FAMILY MEDICINE CLINIC | Facility: CLINIC | Age: 37
End: 2022-11-28

## 2022-11-28 NOTE — TELEPHONE ENCOUNTER
From: Lisseth Adamson  To: Claire Cedeño MD  Sent: 11/28/2022 9:22 AM CST  Subject: Question regarding HSV 1 AND 2-SPECIFIC AB, IGG    Forgot to ask you on thr phone. If I should ever have something pop up what exactly would I be looking for on the lip? Also is there a medication that would be prescribed that I take in pill form?

## 2022-11-29 ENCOUNTER — PATIENT MESSAGE (OUTPATIENT)
Dept: FAMILY MEDICINE CLINIC | Facility: CLINIC | Age: 37
End: 2022-11-29

## 2022-11-29 NOTE — TELEPHONE ENCOUNTER
Message noted. Voice message left to make an appt to take a closer look at bumps/ spots on face and answer questions more fully.

## 2022-11-29 NOTE — TELEPHONE ENCOUNTER
Pt denies symptoms at this time, however, noticed small spots to upper lip. Did not notice it before. Denies pain or other sores. Pt had multiple questions regarding Herpes positive results. Pt states he was not able to ask Dr. Darnell Luevano last time. Pt is wondering how it is possible for him to test positive, and not his wife, who he has been with for 6 years. Pt also asked if there are any neurological deficits in the future for having Herpes. Denies symptoms at this time. Pt was asking for medications for herpes, if current spot on upper lip is related to herpes. Please see pictures in media tab.

## 2023-09-05 ENCOUNTER — TELEMEDICINE (OUTPATIENT)
Dept: FAMILY MEDICINE CLINIC | Facility: CLINIC | Age: 38
End: 2023-09-05

## 2023-09-05 ENCOUNTER — NURSE TRIAGE (OUTPATIENT)
Dept: FAMILY MEDICINE CLINIC | Facility: CLINIC | Age: 38
End: 2023-09-05

## 2023-09-05 ENCOUNTER — APPOINTMENT (OUTPATIENT)
Dept: GENERAL RADIOLOGY | Age: 38
End: 2023-09-05
Payer: COMMERCIAL

## 2023-09-05 ENCOUNTER — HOSPITAL ENCOUNTER (OUTPATIENT)
Age: 38
Discharge: HOME | End: 2023-09-05
Payer: COMMERCIAL

## 2023-09-05 VITALS
SYSTOLIC BLOOD PRESSURE: 150 MMHG | TEMPERATURE: 98 F | OXYGEN SATURATION: 100 % | WEIGHT: 195 LBS | HEIGHT: 70 IN | DIASTOLIC BLOOD PRESSURE: 88 MMHG | RESPIRATION RATE: 16 BRPM | HEART RATE: 84 BPM | BODY MASS INDEX: 27.92 KG/M2

## 2023-09-05 DIAGNOSIS — M54.50 DORSALGIA OF LUMBAR REGION: Primary | ICD-10-CM

## 2023-09-05 DIAGNOSIS — Z20.822 ENCOUNTER FOR LABORATORY TESTING FOR COVID-19 VIRUS: ICD-10-CM

## 2023-09-05 DIAGNOSIS — U07.1 COVID-19: Primary | ICD-10-CM

## 2023-09-05 DIAGNOSIS — M54.9 MODERATE BACK PAIN: ICD-10-CM

## 2023-09-05 DIAGNOSIS — U07.1 COVID-19: ICD-10-CM

## 2023-09-05 LAB
AMB EXT COVID-19 RESULT: DETECTED
SARS-COV-2 RNA RESP QL NAA+PROBE: DETECTED

## 2023-09-05 PROCEDURE — 99212 OFFICE O/P EST SF 10 MIN: CPT | Performed by: FAMILY MEDICINE

## 2023-09-05 PROCEDURE — 72100 X-RAY EXAM L-S SPINE 2/3 VWS: CPT

## 2023-09-05 PROCEDURE — U0002 COVID-19 LAB TEST NON-CDC: HCPCS

## 2023-09-05 PROCEDURE — 99213 OFFICE O/P EST LOW 20 MIN: CPT

## 2023-09-05 PROCEDURE — 72220 X-RAY EXAM SACRUM TAILBONE: CPT

## 2023-09-05 NOTE — DISCHARGE INSTRUCTIONS
There are no fractures on your x-rays. You likely have muscle strain of your back. Take Motrin or Aleve for pain as needed. Use lidocaine patches as discussed for pain control, you can buy them over-the-counter at the 4% strength. If you develop any numbness, tingling, acutely worsening pain, urinary or bowel incontinence or any other concerning complaints you should go to the emergency department. If you have persistent pain for more than the next week make an appointment to see the physical medicine specialist.  Otherwise follow-up with your primary care doctor. COVID test was positive. I sent a prescription for Tessalon Perles for your cough, please remember these may make you drowsy so do not drive or drink alcohol when you take them. You should quarantine for 5 days. Regardless of your symptoms, you should wear a mask in public for 5 days. You should buy a pulse ox and monitor oxygen level, advised to go to the emergency department if SPO2 is less than 92%. Go to the emergency department if you develop any chest pain, shortness of breath, fever, vomiting, diarrhea or any other concerning complaints. Use Flonase for nasal congestion, mucinex for chest congestion, tylenol or ibuprofen for fever or pain. Increase PO fluid intake. Follow up with PCP in 2-3 days.

## 2023-09-05 NOTE — TELEPHONE ENCOUNTER
Action Requested: Summary for Provider     []  Critical Lab, Recommendations Needed  [] Need Additional Advice  [x]   FYI    []   Need Orders  [] Need Medications Sent to Pharmacy  []  Other     SUMMARY: Patient stated that has had on and off symptoms for a while stomach issues, congestion. Last night started with symptoms: headache, chills, congestion. Tested positive for COVID this morning(chart updated). Not short of breath or wheezing. No chest pain. Patient has low back pain which he has an office visit today with Dr Merle Reyez at 3:30pm.  No other symptoms. Went over the following information with patient:     Patient's that test positive for COVID, per CDC guidelines, should quarantine- self isolate from others for the first 5 days from the onset of your symptoms, days 6-10 you can be around others as long as you are fever free for 24 hours without any tylenol/ibuprofen and your symptoms are improving, but you do have to wear a mask around others. Retesting is not recommended, since some patient's can still test positive for COVID up to 90 days from the initial infection, but are not considered contagious once they complete their quarantine period. Please wash hands frequently and sanitize common surfaces to prevent the spread of COVID to others in your home. Anyone you have come into contact with that does not have any symptoms should wear a mask around others and wait at least 5-7 days from the date of last exposure to you to get tested for COVID to prevent a false negative result. You can treat symptoms at home by drinking plenty of fluids, eating regularly, doing deep breathing exercises, salt water gargles/throat lozenges for any phlegm or for a sore throat, monitoring your temperature, ibuprofen/tylenol for pain/fevers, using a humidifier, steaming in the shower, and getting plenty of rest at night.  There are medications available for patient's that test positive for COVID that help lessen the severity of symptoms related to COVID, but should be administered within the first 5 days from the onset of symptoms: Paxlovid(oral medication). If any chest pain, wheezing, or shortness of breath to go to urgent care/emergency room for an evaluation. Patient agreed. Office visit for today changed to a video visit. Went over instructions, copay and that provider will be calling from a restricted/unknown number to make sure able to accept/pickup those calls. Patient agreed.     Future Appointments   Date Time Provider Yesi Toribio   9/5/2023  3:30 PM Allyson Palencia MD [facility]   Reason for call: Covid  Onset: Data Unavailable    Reason for Disposition   [1] Continuous (nonstop) coughing interferes with work or school AND [2] no improvement using cough treatment per Care Advice    Protocols used: Coronavirus ([id number]) Diagnosed or [id number]

## 2023-09-05 NOTE — ED INITIAL ASSESSMENT (HPI)
Pt has had cold symptoms for 3 days. Covid positive today at home. Pt complaining of low back pain for a week. No urinary symptoms.

## 2023-09-05 NOTE — PROGRESS NOTES
Subjective:   Patient ID: Catrina Lefort is a 41year old male. This visit is conducted using Telemedicine with live, interactive video and audio during this Coronavirus pandemic. Please note that the following visit was completed using two-way, real-time interactive audio and/or video communication. This has been done in good emily to provide continuity of care in the best interest of the provider-patient relationship, due to the ongoing public health crisis/national emergency and because of restrictions of visitation. There are limitations of this visit as no physical exam could be performed. Every conscious effort was taken to allow for sufficient and adequate time. This billing was spent on reviewing labs, medications, radiology tests and decision making. Appropriate medical decision-making and tests are ordered as detailed in the plan of care above    Virtual Telephone Check-In    Catrina Lefort verbally consents to a Virtual/Telephone Check-In visit on 09/05/23. Patient has been referred to the [facility] website at [email or web address] to review the yearly Consent to Treat document. Patient understands and accepts financial responsibility for any deductible, co-insurance and/or co-pays associated with this service. Duration of the service: 5 minutes      Summary of topics discussed: low back pain    Pt has had some lower back pain around the tail bone area over the last week. Pt went to immediate care today. Had x-rays done which were unremarkable. No trauma or injury. Was doing some cartwheels. Pt also has had hx of recent COVID- symptoms for the last week. Has had congestion. Tested positive for COVID. No SOB or wheezing. Had some GI symptoms and resolved. Now just had some congestion. Leonor Elaine MD                      History/Other:   Review of Systems  Current Outpatient Medications   Medication Sig Dispense Refill    sertraline 50 MG Oral Tab Take 50 mg by mouth daily. triamcinolone 55 MCG/ACT Nasal Aerosol 1 spray by Nasal route in the morning and 1 spray before bedtime. 10.8 mL 0     Allergies:No Known Allergies    Objective:   Physical Exam  Constitutional:       Appearance: Normal appearance. Pulmonary:      Effort: Pulmonary effort is normal. No respiratory distress. Breath sounds: Normal breath sounds. Comments: Pt is breathing comfortable over the phone and speaking in full sentences without shortness of breath      Neurological:      General: No focal deficit present. Mental Status: He is alert and oriented to person, place, and time. Psychiatric:         Mood and Affect: Mood normal.         Behavior: Behavior normal.         Thought Content: Thought content normal.         Judgment: Judgment normal.         Assessment & Plan:   Dorsalgia of lumbar region/ COVID-19: reviewed x-rays done at immediate care:  - After discussion with patient, to monitor for symptoms and call if any significant symptoms; discussed otc remedies as declined medications at this time. Follow up as needed. VIDEO VISIT done. No orders of the defined types were placed in this encounter.       Meds This Visit:  Requested Prescriptions      No prescriptions requested or ordered in this encounter       Imaging & Referrals:  None

## 2024-01-01 ENCOUNTER — HOSPITAL ENCOUNTER (OUTPATIENT)
Age: 39
Discharge: HOME | End: 2024-01-01
Payer: COMMERCIAL

## 2024-01-01 ENCOUNTER — APPOINTMENT (OUTPATIENT)
Dept: GENERAL RADIOLOGY | Age: 39
End: 2024-01-01
Attending: PHYSICIAN ASSISTANT
Payer: COMMERCIAL

## 2024-01-01 VITALS
SYSTOLIC BLOOD PRESSURE: 145 MMHG | RESPIRATION RATE: 20 BRPM | HEIGHT: 69 IN | OXYGEN SATURATION: 100 % | DIASTOLIC BLOOD PRESSURE: 99 MMHG | BODY MASS INDEX: 29.18 KG/M2 | WEIGHT: 197 LBS | HEART RATE: 82 BPM | TEMPERATURE: 98 F

## 2024-01-01 DIAGNOSIS — R10.84 ABDOMINAL PAIN, GENERALIZED: Primary | ICD-10-CM

## 2024-01-01 LAB
#MXD IC: 1.4 X10ˆ3/UL (ref 0.1–1)
BILIRUB UR QL STRIP: NEGATIVE
BUN BLD-MCNC: 17 MG/DL (ref 7–18)
CHLORIDE BLD-SCNC: 100 MMOL/L (ref 98–112)
CLARITY UR: CLEAR
CO2 BLD-SCNC: 27 MMOL/L (ref 21–32)
COLOR UR: YELLOW
CREAT BLD-MCNC: 0.9 MG/DL
EGFRCR SERPLBLD CKD-EPI 2021: 114 ML/MIN/1.73M2 (ref 60–?)
GLUCOSE BLD-MCNC: 95 MG/DL (ref 70–99)
GLUCOSE UR STRIP-MCNC: NEGATIVE MG/DL
HCT VFR BLD AUTO: 42 %
HCT VFR BLD CALC: 46 %
HGB BLD-MCNC: 14 G/DL
ISTAT IONIZED CALCIUM FOR CHEM 8: 1.18 MMOL/L (ref 1.12–1.32)
KETONES UR STRIP-MCNC: NEGATIVE MG/DL
LEUKOCYTE ESTERASE UR QL STRIP: NEGATIVE
LYMPHOCYTES # BLD AUTO: 3.4 X10ˆ3/UL (ref 1–4)
LYMPHOCYTES NFR BLD AUTO: 27.7 %
MCH RBC QN AUTO: 28.1 PG (ref 26–34)
MCHC RBC AUTO-ENTMCNC: 33.3 G/DL (ref 31–37)
MCV RBC AUTO: 84.3 FL (ref 80–100)
MIXED CELL %: 11 %
NEUTROPHILS # BLD AUTO: 7.5 X10ˆ3/UL (ref 1.5–7.7)
NEUTROPHILS NFR BLD AUTO: 61.3 %
NITRITE UR QL STRIP: NEGATIVE
PH UR STRIP: 5.5 [PH]
PLATELET # BLD AUTO: 352 X10ˆ3/UL (ref 150–450)
POTASSIUM BLD-SCNC: 3.8 MMOL/L (ref 3.6–5.1)
PROT UR STRIP-MCNC: NEGATIVE MG/DL
RBC # BLD AUTO: 4.98 X10ˆ6/UL
SODIUM BLD-SCNC: 140 MMOL/L (ref 136–145)
SP GR UR STRIP: 1.02
UROBILINOGEN UR STRIP-ACNC: <2 MG/DL
WBC # BLD AUTO: 12.3 X10ˆ3/UL (ref 4–11)

## 2024-01-01 PROCEDURE — 80047 BASIC METABLC PNL IONIZED CA: CPT | Performed by: PHYSICIAN ASSISTANT

## 2024-01-01 PROCEDURE — 74018 RADEX ABDOMEN 1 VIEW: CPT | Performed by: PHYSICIAN ASSISTANT

## 2024-01-01 PROCEDURE — 85025 COMPLETE CBC W/AUTO DIFF WBC: CPT | Performed by: PHYSICIAN ASSISTANT

## 2024-01-01 PROCEDURE — 81002 URINALYSIS NONAUTO W/O SCOPE: CPT | Performed by: PHYSICIAN ASSISTANT

## 2024-01-01 PROCEDURE — 99213 OFFICE O/P EST LOW 20 MIN: CPT | Performed by: PHYSICIAN ASSISTANT

## 2024-01-01 RX ORDER — DICYCLOMINE HCL 20 MG
20 TABLET ORAL 4 TIMES DAILY PRN
Qty: 30 TABLET | Refills: 0 | Status: SHIPPED | OUTPATIENT
Start: 2024-01-01 | End: 2024-01-31

## 2024-01-01 RX ORDER — FAMOTIDINE 20 MG/1
20 TABLET, FILM COATED ORAL DAILY
Qty: 14 TABLET | Refills: 0 | Status: SHIPPED | OUTPATIENT
Start: 2024-01-01 | End: 2024-01-15

## 2024-01-01 NOTE — ED PROVIDER NOTES
Chief Complaint   Patient presents with    Abdominal Pain       HPI:     Marcio Steve is a 38 year old male who presents for evaluation of generalized abdominal pain over the last week and a half.  Notes developed initial runny nose and left ear pain over a week ago with child having nonspecific viral symptoms last week.  Notes abdominal discomfort periodic nausea without vomiting.  Notes diarrhea developing over the last 4 days.  Abdominal surgical history: 0.  Denies history headache dizziness sore throat neck pain chest pain shortness of breath hematochezia dysuria upper extremity weakness numbness or swelling.  Denies self or family history of IBS UC Crohn's recent travel outside the country or sick contact.      PFSH    PFS asessment screens reviewed and agree.  Nurses notes reviewed I agree with documentation.    Family History   Problem Relation Age of Onset    Hypertension Father     Depression Father      Family history reviewed with patient/caregiver and is not pertinent to presenting problem.  Social History     Socioeconomic History    Marital status:      Spouse name: Not on file    Number of children: Not on file    Years of education: Not on file    Highest education level: Not on file   Occupational History    Not on file   Tobacco Use    Smoking status: Never    Smokeless tobacco: Never   Vaping Use    Vaping Use: Never used   Substance and Sexual Activity    Alcohol use: Yes     Comment: occ.    Drug use: No    Sexual activity: Not on file   Other Topics Concern    Not on file   Social History Narrative    Not on file     Social Determinants of Health     Financial Resource Strain: Not on file   Food Insecurity: Not on file   Transportation Needs: Not on file   Physical Activity: Not on file   Stress: Not on file   Social Connections: Not on file   Housing Stability: Not on file         ROS:   Positive for stated complaint: Abdominal pain.  Diarrhea.  All other systems reviewed and  negative except as noted above.  Constitutional and Vital Signs Reviewed.      Physical Exam:     Findings:    BP (!) 145/99   Pulse 82   Temp 98.3 °F (36.8 °C) (Oral)   Resp 20   Ht 175.3 cm (5' 9\")   Wt 89.4 kg   SpO2 100%   BMI 29.09 kg/m²   GENERAL: well developed, well nourished, well hydrated, no distress  SKIN: good skin turgor, no obvious rashes  NECK: supple, no adenopathy  CARDIO: RRR without murmur  EXTREMITIES: no cyanosis or edema. SINCLAIR without difficulty  GI: Negative Lenz.  Negative McBurney.  Negative rebound.  No inguinal or CVA tenderness.  NO peritoneal changes along the abdominal wall.   normal bowel sounds  HEAD: normocephalic, atraumatic  EYES: sclera non icteric bilateral, conjunctiva clear  EARS: TMs clear bilaterally. Canals clear.  NOSE: Positive rhinorrhea.  Nasal turbinates: pink, normal mucosa  THROAT: clear, without exudates, uvula midline, and airway patent  LUNGS: clear to auscultation bilaterally; no rales, rhonchi, or wheezes  NEURO: No focal deficits  PSYCH: Alert and oriented x3.  Answering questions appropriately.  Mood appropriate.    MDM/Assessment/Plan:   Orders for this encounter:    Orders Placed This Encounter    XR ABDOMEN (1 VIEW) (CPT=74018)     Order Specific Question:   What is the Relevant Clinical Indication / Reason for Exam?     Answer:   pain, r/o obstructive patterns     Order Specific Question:   Release to patient     Answer:   Immediate    POCT CBC     Order Specific Question:   Release to patient     Answer:   Immediate    POCT Urinalysis Dipstick    POCT ISTAT chem8 cartridge    iStat (Chem 8)    POCT Urine Dip    dicyclomine 20 MG Oral Tab     Sig: Take 1 tablet (20 mg total) by mouth 4 (four) times daily as needed.     Dispense:  30 tablet     Refill:  0    famotidine (PEPCID) 20 MG Oral Tab     Sig: Take 1 tablet (20 mg total) by mouth daily for 14 days.     Dispense:  14 tablet     Refill:  0       Labs performed this visit:  Recent Results (from  the past 10 hour(s))   POCT Urinalysis Dipstick    Collection Time: 01/01/24 11:05 AM   Result Value Ref Range    Urine Color Yellow Yellow    Urine Clarity Clear Clear    Specific Gravity, Urine 1.025 1.005 - 1.030    PH, Urine 5.5 5.0 - 8.0    Protein urine Negative Negative mg/dL    Glucose, Urine Negative Negative mg/dL    Ketone, Urine Negative Negative mg/dL    Bilirubin, Urine Negative Negative    Blood, Urine Trace-Intact (A) Negative    Nitrite Urine Negative Negative    Urobilinogen urine <2.0 <2.0 mg/dL    Leukocyte esterase urine Negative Negative   POCT CBC    Collection Time: 01/01/24 12:07 PM   Result Value Ref Range    WBC IC 12.3 (H) 4.0 - 11.0 x10ˆ3/uL    RBC IC 4.98 4.30 - 5.70 X10ˆ6/uL    HGB IC 14.0 13.0 - 17.5 g/dL    HCT IC 42.0 39.0 - 53.0 %    MCV IC 84.3 80.0 - 100.0 fL    MCH IC 28.1 26.0 - 34.0 pg    MCHC IC 33.3 31.0 - 37.0 g/dL    PLT .0 150.0 - 450.0 X10ˆ3/uL    # Neutrophil 7.5 1.5 - 7.7 X10ˆ3/uL    # Lymphocyte 3.4 1.0 - 4.0 X10ˆ3/uL    # Mixed Cells 1.4 (H) 0.1 - 1.0 X10ˆ3/uL    Neutrophil % 61.3 %    Lymphocyte % 27.7 %    Mixed Cell % 11.0 %   POCT ISTAT chem8 cartridge    Collection Time: 01/01/24 12:13 PM   Result Value Ref Range    ISTAT Sodium 140 136 - 145 mmol/L    ISTAT BUN 17 7 - 18 mg/dL    ISTAT Potassium 3.8 3.6 - 5.1 mmol/L    ISTAT Chloride 100 98 - 112 mmol/L    ISTAT Ionized Calcium 1.18 1.12 - 1.32 mmol/L    ISTAT Hematocrit 46 37 - 53 %    ISTAT Glucose 95 70 - 99 mg/dL    ISTAT TCO2 27 21 - 32 mmol/L    ISTAT Creatinine 0.90 0.70 - 1.30 mg/dL    eGFR-Cr 114 >=60 mL/min/1.73m2       MDM:  Patient with mild leukocytosis otherwise chemistries urinalysis unremarkable.  X-ray without obstructive patterns.  Discussed with patient considerations based on location of pain for CT to rule out appendicitis versus other acute process.  Patient deferring understanding risks.  Will readdress outpatient or go to the ER for breakthrough changes by recommendation.  Will  write supportive Felisa.  Happy with plan of care. Dr Orourke notified.     Diagnosis:    ICD-10-CM    1. Abdominal pain, generalized  R10.84 XR ABDOMEN (1 VIEW) (CPT=74018)     XR ABDOMEN (1 VIEW) (CPT=74018)          All results reviewed and discussed with patient.  See AVS for detailed discharge instructions for your condition today.    Follow Up with:  Roderick Sadler MD  [address] [place] [address]  [phone]    Schedule an appointment as soon as possible for a visit in 3 days  FOLLOW UP. GO TO ER AS RECOMMENDING FOR BREAKTHROUGH PAIN

## 2024-01-25 ENCOUNTER — OFFICE VISIT (OUTPATIENT)
Dept: FAMILY MEDICINE CLINIC | Facility: CLINIC | Age: 39
End: 2024-01-25
Payer: COMMERCIAL

## 2024-01-25 VITALS
SYSTOLIC BLOOD PRESSURE: 135 MMHG | HEIGHT: 69 IN | DIASTOLIC BLOOD PRESSURE: 85 MMHG | TEMPERATURE: 98 F | WEIGHT: 194 LBS | BODY MASS INDEX: 28.73 KG/M2 | RESPIRATION RATE: 16 BRPM | HEART RATE: 86 BPM

## 2024-01-25 DIAGNOSIS — Z00.00 ROUTINE PHYSICAL EXAMINATION: Primary | ICD-10-CM

## 2024-01-25 PROCEDURE — 3079F DIAST BP 80-89 MM HG: CPT | Performed by: FAMILY MEDICINE

## 2024-01-25 PROCEDURE — 99395 PREV VISIT EST AGE 18-39: CPT | Performed by: FAMILY MEDICINE

## 2024-01-25 PROCEDURE — 3075F SYST BP GE 130 - 139MM HG: CPT | Performed by: FAMILY MEDICINE

## 2024-01-25 PROCEDURE — 3008F BODY MASS INDEX DOCD: CPT | Performed by: FAMILY MEDICINE

## 2024-01-25 NOTE — PROGRESS NOTES
Subjective:   Patient ID: Marcio Steve is a 38 year old male.    Patient is here for routine physical exam. No acute issues. No significant chronic medical problems. Patient is requesting testing. Diet and exercise have been good.   Past medical history, family history, and social history were reviewed.    Pt has had some hx of some stomach issues and went to immediate care and taking prilosec. Symptoms are bloated.         History/Other:   Review of Systems   Constitutional: Negative.  Negative for fever.   HENT: Negative.     Eyes: Negative.    Respiratory: Negative.  Negative for cough and shortness of breath.    Cardiovascular: Negative.  Negative for chest pain.   Gastrointestinal: Negative.  Negative for abdominal pain and blood in stool.   Endocrine: Negative.    Genitourinary: Negative.  Negative for difficulty urinating and dysuria.   Musculoskeletal: Negative.    Skin: Negative.    Allergic/Immunologic: Negative.    Neurological: Negative.  Negative for dizziness and headaches.   Hematological: Negative.    Psychiatric/Behavioral: Negative.  Negative for dysphoric mood. The patient is not nervous/anxious.      Current Outpatient Medications   Medication Sig Dispense Refill    sertraline 50 MG Oral Tab Take 1 tablet (50 mg total) by mouth daily.       Allergies:No Known Allergies    Objective:   Physical Exam  Constitutional:       Appearance: Normal appearance. He is well-developed.   HENT:      Head: Normocephalic.      Right Ear: Tympanic membrane, ear canal and external ear normal.      Left Ear: Tympanic membrane, ear canal and external ear normal.      Nose: Nose normal.      Mouth/Throat:      Mouth: Mucous membranes are moist.      Pharynx: No oropharyngeal exudate or posterior oropharyngeal erythema.   Eyes:      Conjunctiva/sclera: Conjunctivae normal.   Cardiovascular:      Rate and Rhythm: Normal rate and regular rhythm.      Pulses: Normal pulses.      Heart sounds: Normal heart sounds.    Pulmonary:      Effort: Pulmonary effort is normal. No respiratory distress.      Breath sounds: Normal breath sounds. No wheezing or rales.   Abdominal:      General: Abdomen is flat. There is no distension.      Palpations: Abdomen is soft. There is no mass.      Tenderness: There is no abdominal tenderness.   Musculoskeletal:         General: Normal range of motion.      Cervical back: Normal range of motion and neck supple.   Skin:     General: Skin is warm.   Neurological:      General: No focal deficit present.      Mental Status: He is alert and oriented to person, place, and time.      Sensory: No sensory deficit.      Deep Tendon Reflexes: Reflexes are normal and symmetric. Reflexes normal.   Psychiatric:         Mood and Affect: Mood normal.         Behavior: Behavior normal.         Assessment & Plan:   1. Routine physical examination:  - Exam is unremarkable. Screening tests were discussed, and after discussion, will check lab work as below. Healthy diet, exercise, and weight were discussed. To call if problems and follow up and further management after testing. Routine follow up.         Orders Placed This Encounter   Procedures    CBC, Platelet; No Differential    Comp Metabolic Panel (14)    Lipid Panel       Meds This Visit:  Requested Prescriptions      No prescriptions requested or ordered in this encounter       Imaging & Referrals:  None

## 2024-01-26 ENCOUNTER — LAB ENCOUNTER (OUTPATIENT)
Dept: LAB | Age: 39
End: 2024-01-26
Attending: FAMILY MEDICINE
Payer: COMMERCIAL

## 2024-01-26 DIAGNOSIS — Z00.00 ROUTINE PHYSICAL EXAMINATION: ICD-10-CM

## 2024-01-26 LAB
ALBUMIN SERPL-MCNC: 4.3 G/DL (ref 3.2–4.8)
ALBUMIN/GLOB SERPL: 1.3 {RATIO} (ref 1–2)
ALP LIVER SERPL-CCNC: 89 U/L
ALT SERPL-CCNC: 21 U/L
ANION GAP SERPL CALC-SCNC: 9 MMOL/L (ref 0–18)
AST SERPL-CCNC: 19 U/L (ref ?–34)
BILIRUB SERPL-MCNC: 0.7 MG/DL (ref 0.3–1.2)
BUN BLD-MCNC: 12 MG/DL (ref 9–23)
BUN/CREAT SERPL: 13.3 (ref 10–20)
CALCIUM BLD-MCNC: 9.6 MG/DL (ref 8.7–10.4)
CHLORIDE SERPL-SCNC: 106 MMOL/L (ref 98–112)
CHOLEST SERPL-MCNC: 200 MG/DL (ref ?–200)
CO2 SERPL-SCNC: 26 MMOL/L (ref 21–32)
CREAT BLD-MCNC: 0.9 MG/DL
DEPRECATED RDW RBC AUTO: 42.5 FL (ref 35.1–46.3)
EGFRCR SERPLBLD CKD-EPI 2021: 114 ML/MIN/1.73M2 (ref 60–?)
ERYTHROCYTE [DISTWIDTH] IN BLOOD BY AUTOMATED COUNT: 13.8 % (ref 11–15)
FASTING PATIENT LIPID ANSWER: YES
FASTING STATUS PATIENT QL REPORTED: YES
GLOBULIN PLAS-MCNC: 3.2 G/DL (ref 2.8–4.4)
GLUCOSE BLD-MCNC: 88 MG/DL (ref 70–99)
HCT VFR BLD AUTO: 42 %
HDLC SERPL-MCNC: 39 MG/DL (ref 40–59)
HGB BLD-MCNC: 14.3 G/DL
LDLC SERPL CALC-MCNC: 141 MG/DL (ref ?–100)
MCH RBC QN AUTO: 28.9 PG (ref 26–34)
MCHC RBC AUTO-ENTMCNC: 34 G/DL (ref 31–37)
MCV RBC AUTO: 85 FL
NONHDLC SERPL-MCNC: 161 MG/DL (ref ?–130)
OSMOLALITY SERPL CALC.SUM OF ELEC: 291 MOSM/KG (ref 275–295)
PLATELET # BLD AUTO: 351 10(3)UL (ref 150–450)
POTASSIUM SERPL-SCNC: 4.2 MMOL/L (ref 3.5–5.1)
PROT SERPL-MCNC: 7.5 G/DL (ref 5.7–8.2)
RBC # BLD AUTO: 4.94 X10(6)UL
SODIUM SERPL-SCNC: 141 MMOL/L (ref 136–145)
TRIGL SERPL-MCNC: 108 MG/DL (ref 30–149)
VLDLC SERPL CALC-MCNC: 20 MG/DL (ref 0–30)
WBC # BLD AUTO: 9.3 X10(3) UL (ref 4–11)

## 2024-01-26 PROCEDURE — 36415 COLL VENOUS BLD VENIPUNCTURE: CPT

## 2024-01-26 PROCEDURE — 80053 COMPREHEN METABOLIC PANEL: CPT

## 2024-01-26 PROCEDURE — 85027 COMPLETE CBC AUTOMATED: CPT

## 2024-01-26 PROCEDURE — 80061 LIPID PANEL: CPT

## 2024-02-18 ENCOUNTER — HOSPITAL ENCOUNTER (OUTPATIENT)
Age: 39
Discharge: HOME | End: 2024-02-18
Payer: COMMERCIAL

## 2024-02-18 VITALS
SYSTOLIC BLOOD PRESSURE: 141 MMHG | HEART RATE: 85 BPM | TEMPERATURE: 97 F | DIASTOLIC BLOOD PRESSURE: 88 MMHG | RESPIRATION RATE: 18 BRPM | OXYGEN SATURATION: 97 %

## 2024-02-18 DIAGNOSIS — J06.9 VIRAL URI WITH COUGH: Primary | ICD-10-CM

## 2024-02-18 LAB — S PYO AG THROAT QL: NEGATIVE

## 2024-02-18 RX ORDER — DEXAMETHASONE SODIUM PHOSPHATE 10 MG/ML
10 INJECTION, SOLUTION INTRAMUSCULAR; INTRAVENOUS ONCE
Status: COMPLETED | OUTPATIENT
Start: 2024-02-18 | End: 2024-02-18

## 2024-02-18 RX ORDER — BENZONATATE 100 MG/1
100 CAPSULE ORAL 3 TIMES DAILY PRN
Qty: 30 CAPSULE | Refills: 0 | Status: SHIPPED | OUTPATIENT
Start: 2024-02-18 | End: 2024-02-28

## 2024-02-18 NOTE — DISCHARGE INSTRUCTIONS
You are negative for strep throat.  As discussed, symptoms have been ongoing for 7 days, you are out of window for any therapeutic treatment or isolation for COVID-19 and influenza which is why you are not tested for these viruses today.  Continue with supportive and symptomatic treatment at home.    Sleep somewhat elevated upright, sleep with humidifier, steam showers for cough and congestion.  Drink plenty of water and electrolytes.  Salt water gargles for throat discomfort.  Warm tea with honey, Cepacol lozenges, Chloraseptic spray for throat discomfort as well.    I have prescribed a cough suppressant which you may take up to 3 times a day for dry cough.    Go to ER if you have any chest pain, dizziness, lightness, palpitations, shortness of breath.

## 2024-02-18 NOTE — ED INITIAL ASSESSMENT (HPI)
Strep positive family members. Pt c/o cough, congestion, sore throat, headache which started monday

## 2024-02-18 NOTE — ED PROVIDER NOTES
Patient Seen in: [facility]      History     Chief Complaint   Patient presents with    Sore Throat     Stated Complaint: Congestion    Subjective: 38-year-old male, no significant medical history, presents to immediate care with viral symptoms since Monday.  Patient states he is with low-grade fever, chills, body aches, cough, congestion, sore throat, ear pain.  He does state that all his children have been sick with similar symptoms.  States that his son tested positive for strep B and is currently on antibiotics.  Patient states sore throat is the newest symptom since yesterday.  Positive pain with eating, drinking, swallowing.  Patient airway pain.  Tolerating his own secretions.  No wheezing, stridor, voice change.  Patient did test negative for COVID several times this week.  States that his children were evaluated immediate care and tested negative for COVID-19 and influenza as well.  Patient is well-appearing.  Wants to be evaluated prior to leaving for vacation next week.  AOx4.  The history is provided by the patient.           Objective:   Past Medical History:   Diagnosis Date    Anxiety     Patient denies medical problems               Past Surgical History:   Procedure Laterality Date    OTHER      septoplasty- Dr Armas 5/22/19                Social History     Socioeconomic History    Marital status:    Tobacco Use    Smoking status: Never    Smokeless tobacco: Never   Vaping Use    Vaping Use: Never used   Substance and Sexual Activity    Alcohol use: Yes     Comment: occ.    Drug use: No              Review of Systems   Constitutional:  Positive for activity change, appetite change, chills, fatigue and fever.   HENT:  Positive for congestion, ear pain, postnasal drip, rhinorrhea, sinus pressure, sinus pain, sneezing and sore throat. Negative for ear discharge.    Eyes: Negative.    Respiratory:  Positive for cough.    Cardiovascular: Negative.    Gastrointestinal: Negative.     Genitourinary: Negative.    Musculoskeletal: Negative.    Skin: Negative.    Neurological:  Positive for headaches. Negative for dizziness, tremors, seizures, syncope, facial asymmetry, speech difficulty, weakness, light-headedness and numbness.       Positive for stated complaint: Congestion  Other systems are as noted in HPI.  Constitutional and vital signs reviewed.      All other systems reviewed and negative except as noted above.    Physical Exam     ED Triage Vitals [02/18/24 1024]   /88   Pulse 85   Resp 18   Temp 97.3 °F (36.3 °C)   Temp src Temporal   SpO2 97 %   O2 Device None (Room air)       Current:/88   Pulse 85   Temp 97.3 °F (36.3 °C) (Temporal)   Resp 18   SpO2 97%         Physical Exam  Constitutional:       General: He is not in acute distress.     Appearance: He is well-developed. He is not ill-appearing or toxic-appearing.   HENT:      Head: Normocephalic.      Jaw: There is normal jaw occlusion. No trismus, tenderness, swelling, pain on movement or malocclusion.      Right Ear: Tympanic membrane and ear canal normal. No tenderness. No middle ear effusion. Tympanic membrane is not erythematous.      Left Ear: Tympanic membrane and ear canal normal. No tenderness.  No middle ear effusion. Tympanic membrane is not erythematous.      Nose: Congestion and rhinorrhea present.      Mouth/Throat:      Lips: Pink.      Mouth: Mucous membranes are moist.      Pharynx: Uvula midline. No pharyngeal swelling, oropharyngeal exudate, posterior oropharyngeal erythema or uvula swelling.      Tonsils: No tonsillar exudate or tonsillar abscesses.   Eyes:      Conjunctiva/sclera: Conjunctivae normal.      Pupils: Pupils are equal, round, and reactive to light.   Cardiovascular:      Rate and Rhythm: Normal rate and regular rhythm.      Heart sounds: Normal heart sounds.   Pulmonary:      Effort: Pulmonary effort is normal. No respiratory distress.      Breath sounds: Normal breath sounds. No  stridor. No wheezing, rhonchi or rales.   Chest:      Chest wall: No tenderness.   Musculoskeletal:      Cervical back: Normal range of motion and neck supple.   Lymphadenopathy:      Cervical: No cervical adenopathy.   Skin:     General: Skin is warm.      Capillary Refill: Capillary refill takes less than 2 seconds.   Neurological:      Mental Status: He is alert and oriented to person, place, and time.               ED Course     Labs Reviewed   POCT RAPID STREP - Normal                      MDM      Differentials considered include: Strep pharyngitis, COVID-19, viral URI, pneumonia    Tested negative for COVID-19 x 2 this week.  I do not believe any further testing is warranted for COVID-19.    Patient has had symptoms for about 6 to 7 days, he is out of quarantine isolation and therapeutic treatment window for influenza.  Additionally, his children tested negative for influenza A influenza B.  Did discuss with patient that I do not believe any respiratory testing is warranted today.    Patient's lungs are clear to auscultation, no wheezing, stridor, crackles, consolidation.  No evidence of pneumonia or bronchitis.      Patient is negative for strep pharyngitis.  He was given one-time dose of Decadron due to throat discomfort.  There is minimal erythema with no edema or exudate.  Uvula midline normal in size per mucous membranes moist.  No intraoral lesions or petechiae.  No evidence of peritonsillar abscess.    Patient likely has viral URI.  Discussed with patient.  He is aware of supportive and symptomatic treatment at home.  Did describe Tessalon Perles.    Please understand symptoms that were immediate ER evaluation.  He verbalized understanding agrees with plan of care.                         Medical Decision Making      Disposition and Plan     Clinical Impression:  1. Viral URI with cough         Disposition:  There is no disposition on file for this visit.  There is no disposition time on file for this  visit.    Follow-up:  Jason Jacinto, RADAH  [address]  [facility] [address]  [phone]      As needed          Medications Prescribed:  Discharge Medication List as of 2/18/2024 11:15 AM        START taking these medications    Details   benzonatate 100 MG Oral Cap Take 1 capsule (100 mg total) by mouth 3 (three) times daily as needed for cough., Normal, Disp-30 capsule, R-0

## 2024-04-04 ENCOUNTER — TELEMEDICINE (OUTPATIENT)
Dept: TELEHEALTH | Age: 39
End: 2024-04-04
Payer: COMMERCIAL

## 2024-04-04 DIAGNOSIS — J01.10 ACUTE NON-RECURRENT FRONTAL SINUSITIS: Primary | ICD-10-CM

## 2024-04-04 PROCEDURE — 99213 OFFICE O/P EST LOW 20 MIN: CPT | Performed by: NURSE PRACTITIONER

## 2024-04-04 RX ORDER — AMOXICILLIN AND CLAVULANATE POTASSIUM 875; 125 MG/1; MG/1
1 TABLET, FILM COATED ORAL 2 TIMES DAILY WITH MEALS
Qty: 14 TABLET | Refills: 0 | Status: SHIPPED | OUTPATIENT
Start: 2024-04-04 | End: 2024-04-11

## 2024-04-04 NOTE — PATIENT INSTRUCTIONS
Take antibiotic as prescribed  Drink plenty of fluids  Probiotic or yogurt daily for 2-3 weeks - take 2-3 hours after antibiotic  Follow up if no improvement in 2-3 days or sooner for new or worsening symptoms

## 2024-04-04 NOTE — PROGRESS NOTES
Virtual/Telephone Check-In    Marcio Steve verbally consents to a Virtual/Telephone Check-In service on 04/04/24.  Patient has been referred to the [facility] website at [email or web address] to review the yearly Consent to Treat document.  Patient understands and accepts financial responsibility for any deductible, co-insurance and/or co-pays associated with this service.       Telehealth Verbal Consent   I conducted a telehealth visit with Marcio Steve today, 04/04/24, which was completed using two-way, real-time interactive audio and video communication. This has been done in good emily to provide continuity of care in the best interest of the provider-patient relationship, due to the COVID -19 public health crisis/national emergency where restrictions of face-to-face office visits are ongoing. Every conscious effort was taken to allow for sufficient and adequate time to complete the visit.  The patient was made aware of the limitations of the telehealth visit, including treatment limitations as no physical exam could be performed.  The patient was advised to call 911 or to go to the ER in case there was an emergency.  The patient was also advised of the potential privacy & security concerns related to the telehealth platform.   The patient was made aware of where to find [facility]'s notice of privacy practices, telehealth consent form and other related consent forms and documents.  which are located on the [facility] website. The patient verbally agreed to telehealth consent form, related consents and the risks discussed.    Lastly, the patient confirmed that they were in [place].   Included in this visit, time may have been spent reviewing labs, medications, radiology tests and decision making. Appropriate medical decision-making and tests are ordered as detailed in the plan of care above.  Coding/billing information is submitted for this visit based on complexity of care and/or time spent for the visit.    CHIEF  COMPLAINT:     Chief Complaint   Patient presents with    Sinus Problem       HPI:   Marcio Steve is a 39 year old male who presents for a video visit.  Patient reports sinus congestion waxing and waning for 6 weeks.  Reports severe congestion with sinus pain at forehead;  temp 100F yesterday; nasal mucus - large amts very thick yellow.  Hx of sinusitis with similar sx    Sx onset: 6 weeks ago  Treating sx with sudafed, navage  COVID test taken since sx onset: COVID test negative yesterday     Associated symptoms:    Yes   No  [x]    [] Fever - tactile  [x]    [] Cough:             Dry []     Productive [x]   [x]    [] Congestion   []    [x] Loss of Smell/Taste:    []    [x] Sore throat     [x]    [] Ear Pain - intermittent slight left ear    []    [x] Fatigue    []    [x] Myalgias  [x]    [] Chills - slight yesterday        [x]    [] Headache    []    [x] Shortness of breath/Trouble Breathing  []    [x] Wheezing  []    [x] Chest pain/pressure    []    [x] GI symptoms                 []  Nausea;   [] Vomiting;   [] Diarrhea;   [] Upset stomach;    []Abdominal Pain         Current Outpatient Medications   Medication Sig Dispense Refill    sertraline 50 MG Oral Tab Take 1 tablet (50 mg total) by mouth daily.        Past Medical History:   Diagnosis Date    Anxiety     Patient denies medical problems       Past Surgical History:   Procedure Laterality Date    OTHER      septoplasty- Dr Armas 5/22/19         Social History     Socioeconomic History    Marital status:    Tobacco Use    Smoking status: Never    Smokeless tobacco: Never   Vaping Use    Vaping Use: Never used   Substance and Sexual Activity    Alcohol use: Yes     Comment: occ.    Drug use: No         REVIEW OF SYSTEMS:   GENERAL: normal appetite  SKIN: no rashes or abnormal skin lesions  HEENT: See HPI  LUNGS:  See HPI  CARDIOVASCULAR: see HPI  GI: see HPI  NEURO: See HPI    EXAM:   General: Alert, Well-appearing, and In no acute  distress  Respiratory:   Speaking in full sentences comfortably  Normal work of breathing  No cough during visit  Head: Normocephalic  Eyes: Conjunctiva clear  Nose: No obvious nasal discharge.  Mood: Affect appropriate    ASSESSMENT AND PLAN:   Marcio Steve is a 39 year old male who presents with symptoms that are consistent with    ASSESSMENT/PLAN:       Diagnoses and all orders for this visit:    Acute non-recurrent frontal sinusitis  -     amoxicillin clavulanate 875-125 MG Oral Tab; Take 1 tablet by mouth 2 (two) times daily with meals for 7 days. Take with food.      Will treat with medication as listed  Discussed use, dose, and possible side effects  Comfort measures as per pt instructions  To f/u with PCP if no improvement in 3-5 days or sooner for new or worsening symptoms  See pt instructions        Patient Instructions   Take antibiotic as prescribed  Drink plenty of fluids  Probiotic or yogurt daily for 2-3 weeks - take 2-3 hours after antibiotic  Follow up if no improvement in 2-3 days or sooner for new or worsening symptoms     Verbalized understanding of instructions        Face to face time spent on Video Visit: 6:50 min  Total Time spent on visit including reviewing history, ordering labs/medication, patient examination and education: 10 min

## 2024-08-12 ENCOUNTER — HOSPITAL ENCOUNTER (OUTPATIENT)
Age: 39
Discharge: HOME | End: 2024-08-12
Payer: COMMERCIAL

## 2024-08-12 VITALS
HEART RATE: 77 BPM | OXYGEN SATURATION: 100 % | SYSTOLIC BLOOD PRESSURE: 136 MMHG | DIASTOLIC BLOOD PRESSURE: 88 MMHG | RESPIRATION RATE: 18 BRPM | TEMPERATURE: 97 F
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
## 2024-08-12 DIAGNOSIS — A69.20 ERYTHEMA MIGRANS (LYME DISEASE): ICD-10-CM

## 2024-08-12 PROCEDURE — 99213 OFFICE O/P EST LOW 20 MIN: CPT | Performed by: PHYSICIAN ASSISTANT

## 2024-08-12 RX ORDER — DOXYCYCLINE HYCLATE 100 MG/1
100 CAPSULE ORAL 2 TIMES DAILY
Qty: 20 CAPSULE | Refills: 0 | Status: SHIPPED | OUTPATIENT
Start: 2024-08-12 | End: 2024-08-22

## 2024-08-12 NOTE — ED PROVIDER NOTES
Chief Complaint   Patient presents with    Bite Sting,Insect       History obtained from: patient   services not used    HPI:     Marcio Steve is a 39 year old male who presents with multiple insect bites to bilateral arms and neck over the past week. Patient notes one bite to right side of neck within the past 1-2 days with a ring of redness spreading around bite. Patient endorses associated itching. Patient states he has mosquito bites to ankles but states bites to neck and arms appear different.  Patient states he is otherwise feeling well and denies any additional complaints or concerns. Denies fevers, chills, swelling, drainage or bleeding from skin, blisters, neck stiffness, myalgias, arthralgias, headache. Denies recent travel.     PMH  Past Medical History:    Anxiety    Patient denies medical problems       PFSH    PFS asessment screens reviewed and agree.  Nurses notes reviewed I agree with documentation.    Family History   Problem Relation Age of Onset    Hypertension Father     Depression Father      Family history reviewed with patient/caregiver and is not pertinent to presenting problem.  Social History     Socioeconomic History    Marital status:      Spouse name: Not on file    Number of children: Not on file    Years of education: Not on file    Highest education level: Not on file   Occupational History    Not on file   Tobacco Use    Smoking status: Never    Smokeless tobacco: Never   Vaping Use    Vaping status: Never Used   Substance and Sexual Activity    Alcohol use: Yes     Comment: occ.    Drug use: No    Sexual activity: Not on file   Other Topics Concern    Not on file   Social History Narrative    Not on file     Social Determinants of Health     Financial Resource Strain: Not on file   Food Insecurity: Not on file   Transportation Needs: Not on file   Physical Activity: Not on file   Stress: Not on file   Social Connections: Unknown (3/18/2021)    Received from [facility], [facility]    Social Connections     Conversations with friends/family/neighbors per week: Not on file   Housing Stability: Low Risk  (7/8/2021)    Received from [facility], [facility]    Housing Stability     Mortgage Payment Concerns?: Not on file     Number of Places Lived in the Last Year: Not on file     Unstable Housing?: Not on file         ROS:   Positive for stated complaint: insect bites   All other systems reviewed and negative except as noted above.  Constitutional and Vital Signs Reviewed.    Physical Exam:     Findings:    /88   Pulse 77   Temp 96.9 °F (36.1 °C) (Temporal)   Resp 18   SpO2 100%   GENERAL: well developed, no acute distress, non-toxic appearing   SKIN: good skin turgor, erythematous papule with surrounding erythema with central clearing to right neck (see image below), few discrete erythematous papules to left neck and LUE, no vesicles, no drainage or bleeding, no masses or fluctuance, no petechiae or purpura   HEAD: normocephalic, atraumatic  EYES: sclera non-icteric bilaterally, conjunctiva clear bilaterally  OROPHARYNX: no angioedema, MMM, pharynx clear, maintaining airway and secretions  NECK: supple, no lymphadenopathy, no nuchal rigidity, no trismus, no edema, phonation normal    CARDIO: RRR, normal heart sounds   LUNGS: clear to auscultation bilaterally, no increased WOB, no rales, rhonchi, or wheezes  EXTREMITIES: no cyanosis or edema, SINCLAIR without difficulty, compartments soft, CMS intact   NEURO: no focal deficits  PSYCH: alert and oriented x3, answering questions appropriately, mood appropriate        MDM/Assessment/Plan:   Orders for this encounter:    Orders Placed This Encounter    doxycycline 100 MG Oral Cap     Sig: Take 1 capsule (100 mg total) by mouth 2 (two) times daily for 10 days.     Dispense:  20 capsule     Refill:  0       Labs performed this visit:  No results found  for this or any previous visit (from the past 10 hour(s)).    Imaging performed this visit:  No orders to display       Medical Decision Making  DDx includes insect bite versus erythema migrans versus localized reaction versus other.  Patient is overall very well-appearing with stable vitals and tolerating oral intake.  No signs or symptoms of systemic illness.  Discussed at length with patient possible etiologies of symptoms including erythema migrans.  Low suspicion for Lyme disease given no tick seen at any point, no recent travel to endemic area, and multiple other insect bites however offered empiric treatment for erythema migrans given physical appearance of rash to right side of neck as pictured above. Shared decision making employed with patient including extensive conversation regarding risks vs benefits of empiric treatment at this time. Patient verbalizes understanding and is requesting prescription for empiric treatment sent to pharmacy and patient states he will discuss options with his wife and decide whether or not to complete course of antibiotics at home. Rx doxycycline. Discussed supportive care including OTC antihistamine for itching as needed, OTC steroid cream for itching as needed, rest, and increased fluid intake.  Instructed patient to go directly to nearest ER with any worsening or concerning symptoms.  Follow-up with PCP.    Risk  OTC drugs.  Prescription drug management.          Diagnosis:    ICD-10-CM    1. Insect bite, unspecified site, initial encounter  W57.XXXA       2. Erythema migrans (Lyme disease)  A69.20           All results reviewed and discussed with patient/patient's family. Patient/patient's family verbalize excellent understanding of instructions and feels comfortable with plan. All of patient's/patient's family's questions were addressed.   See AVS for detailed discharge instructions for your condition today.    Follow Up with:  Florencio Cornejo  [address]  [facility] [id number]  [phone]            Note: This document was dictated using Dragon medical dictation software.  Proofreading was performed to the best of my ability, but errors may be present.    Kimberly Montalvo PA-C

## 2024-08-12 NOTE — DISCHARGE INSTRUCTIONS
Monitor rash closely for any worsening appearance  Complete entire course of antibiotic as directed. Take with food.   Follow up with your primary care provider

## 2025-01-03 ENCOUNTER — HOSPITAL ENCOUNTER (OUTPATIENT)
Age: 40
Discharge: HOME | End: 2025-01-03
Payer: COMMERCIAL

## 2025-01-03 VITALS
TEMPERATURE: 100 F | SYSTOLIC BLOOD PRESSURE: 146 MMHG | RESPIRATION RATE: 18 BRPM | OXYGEN SATURATION: 100 % | HEART RATE: 87 BPM | DIASTOLIC BLOOD PRESSURE: 99 MMHG

## 2025-01-03 DIAGNOSIS — R09.82 POST-NASAL DRIP: ICD-10-CM

## 2025-01-03 DIAGNOSIS — J01.90 ACUTE SINUSITIS, RECURRENCE NOT SPECIFIED, UNSPECIFIED LOCATION: Primary | ICD-10-CM

## 2025-01-03 DIAGNOSIS — R03.0 ELEVATED BLOOD PRESSURE READING: ICD-10-CM

## 2025-01-03 DIAGNOSIS — Z20.822 LAB TEST NEGATIVE FOR COVID-19 VIRUS: ICD-10-CM

## 2025-01-03 LAB
POCT INFLUENZA A: NEGATIVE
POCT INFLUENZA B: NEGATIVE
SARS-COV-2 RNA RESP QL NAA+PROBE: NOT DETECTED

## 2025-01-03 NOTE — ED PROVIDER NOTES
Patient Seen in: [facility]      History     Chief Complaint   Patient presents with    Runny Nose    Sinus Problem     Stated Complaint: Ear Pain/ Sinus Pressure    Subjective:   HPI      This is a 39-year-old male with history of anxiety presenting with sinus pressure nasal congestion runny nose ear pain postnasal drip.  Patient states he has had symptoms for about 2 weeks initially felt like things were getting better has been doing over-the-counter medications but symptoms have progressed.  Denies fever chest pain shortness of breath nausea vomiting or diarrhea.    Objective:     No pertinent past medical history.            No pertinent past surgical history.              No pertinent social history.            Review of Systems    Positive for stated complaint: Ear Pain/ Sinus Pressure  Other systems are as noted in HPI.  Constitutional and vital signs reviewed.      All other systems reviewed and negative except as noted above.    Physical Exam     ED Triage Vitals [01/03/25 0901]   BP (!) 140/91   Pulse 87   Resp 18   Temp 99.7 °F (37.6 °C)   Temp src Oral   SpO2 100 %   O2 Device None (Room air)       Current Vitals:   Vital Signs  BP: (!) 146/99  Pulse: 87  Resp: 18  Temp: 99.7 °F (37.6 °C)  Temp src: Oral    Oxygen Therapy  SpO2: 100 %  O2 Device: None (Room air)        Physical Exam  Vitals and nursing note reviewed.   Constitutional:       Appearance: Normal appearance.   HENT:      Right Ear: Tympanic membrane normal.      Left Ear: Tympanic membrane normal.      Nose: Congestion and rhinorrhea present.      Right Turbinates: Enlarged.      Left Turbinates: Enlarged.      Mouth/Throat:      Mouth: Mucous membranes are moist.      Pharynx: Oropharynx is clear. No posterior oropharyngeal erythema.      Tonsils: No tonsillar exudate or tonsillar abscesses. 0 on the right. 0 on the left.   Eyes:      Conjunctiva/sclera: Conjunctivae normal.   Cardiovascular:      Rate and Rhythm: Normal rate.    Pulmonary:      Effort: Pulmonary effort is normal. No respiratory distress.      Breath sounds: Normal breath sounds. No wheezing.   Musculoskeletal:         General: Normal range of motion.      Cervical back: Normal range of motion. No rigidity or tenderness.   Lymphadenopathy:      Cervical: No cervical adenopathy.   Skin:     General: Skin is warm and dry.      Capillary Refill: Capillary refill takes less than 2 seconds.   Neurological:      General: No focal deficit present.      Mental Status: He is alert and oriented to person, place, and time.   Psychiatric:         Mood and Affect: Mood normal.             ED Course     Labs Reviewed   RAPID SARS-COV-2 BY PCR - Normal   POCT FLU TEST - Normal    Narrative:     This assay is a rapid molecular in vitro test utilizing nucleic acid amplification of influenza A and B viral RNA.            MDM         Medical Decision Making  39-year-old male well-appearing nontoxic with sinus symptoms for 2 weeks.  DDx viral versus bacterial sinusitis versus influenza versus COVID versus another viral illness.  Discussed with the patient COVID and flu swab if negative he will be treated for bacterial sinusitis as clinically he meets the clinical diagnosis of possible bacterial sinusitis patient is agreeable with this plan of care.    COVID and flu negative patient made aware results discussed Augmentin being sent to the pharmacy to treat sinusitis discussed over-the-counter Flonase and Zyrtec recommended no decongestants as his blood pressure is elevated he would need to follow-up with his primary care provider for elevated blood pressure reading discussed over-the-counter ibuprofen and Tylenol to help with pain or discomfort discussed if symptoms do not improve after treatment and it is likely a viral sinusitis and will need to run its course.  All education recommendations placed in discharge paperwork.  Patient agreeable with the plan of care and acknowledges  understanding discharge instructions.    Problems Addressed:  Acute sinusitis, recurrence not specified, unspecified location: acute illness or injury  Elevated blood pressure reading: acute illness or injury  Lab test negative for COVID-19 virus: acute illness or injury  Post-nasal drip: acute illness or injury    Amount and/or Complexity of Data Reviewed  Labs: ordered. Decision-making details documented in ED Course.    Risk  OTC drugs.  Prescription drug management.    Female    Disposition and Plan     Clinical Impression:  1. Acute sinusitis, recurrence not specified, unspecified location    2. Post-nasal drip    3. Lab test negative for COVID-19 virus    4. Elevated blood pressure reading         Disposition:  Discharge  1/3/2025  9:31 am    Follow-up:  Florencio Cornejo MD  [address] [place] [id number] [phone]    Schedule an appointment as soon as possible for a visit in 3 days            Medications Prescribed:  Discharge Medication List as of 1/3/2025  9:34 AM        START taking these medications    Details   amoxicillin clavulanate 875-125 MG Oral Tab Take 1 tablet by mouth 2 (two) times daily for 7 days., Normal, Disp-14 tablet, R-0                 Supplementary Documentation:

## 2025-01-03 NOTE — DISCHARGE INSTRUCTIONS
Follow-up with your primary care provider for elevated blood pressure reading  Start the antibiotic as prescribed take a probiotic or eat yogurt as some antibiotics cause upset stomach and diarrhea continue Flonase and Zyrtec from over-the-counter take ibuprofen and Tylenol for pain follow-up with your primary care provider in a few days if symptoms do not resolve after antibiotic treatment then it is a virus that needs to run its course.  You may take over-the-counter Mucinex if you are actually having a cough and if the cough is not associated with postnasal drip.    If you develop chest pain shortness of breath severe headache dizziness nausea vomiting diarrhea or any new or worsening symptoms go to the nearest emergency department.

## 2025-03-06 ENCOUNTER — OFFICE VISIT (OUTPATIENT)
Dept: FAMILY MEDICINE CLINIC | Facility: CLINIC | Age: 40
End: 2025-03-06
Payer: COMMERCIAL

## 2025-03-06 ENCOUNTER — PATIENT MESSAGE (OUTPATIENT)
Dept: FAMILY MEDICINE CLINIC | Facility: CLINIC | Age: 40
End: 2025-03-06

## 2025-03-06 VITALS
SYSTOLIC BLOOD PRESSURE: 126 MMHG | DIASTOLIC BLOOD PRESSURE: 82 MMHG | BODY MASS INDEX: 28.81 KG/M2 | WEIGHT: 192.31 LBS | HEART RATE: 71 BPM | TEMPERATURE: 97 F | HEIGHT: 68.7 IN | OXYGEN SATURATION: 100 %

## 2025-03-06 DIAGNOSIS — Z00.00 ROUTINE PHYSICAL EXAMINATION: Primary | ICD-10-CM

## 2025-03-06 DIAGNOSIS — F41.9 ANXIETY: ICD-10-CM

## 2025-03-06 DIAGNOSIS — Z30.09 SCREENING AND EVALUATION FOR VASECTOMY: Primary | ICD-10-CM

## 2025-03-06 PROCEDURE — 99395 PREV VISIT EST AGE 18-39: CPT | Performed by: FAMILY MEDICINE

## 2025-03-06 NOTE — PROGRESS NOTES
Subjective:   Patient ID: Marcio Steve is a 40 year old male.    Patient is here for routine physical exam. No acute issues. No significant chronic medical problems. Patient is requesting testing. Diet and exercise have been good.     Past medical history, family history, and social history were reviewed. Family hx of diabetes.  Pt had flu like symptoms after cruise. Son had influenza.  Symptoms resolved.     Patient sees psychiatrist for history of anxiety.  Has been doing well and stable. Would like to transfer prescription for sertraline as his psychiatrist no longer on his insurance.          History/Other:   Review of Systems   Constitutional: Negative.  Negative for fever.   HENT: Negative.     Eyes: Negative.    Respiratory: Negative.  Negative for cough and shortness of breath.    Cardiovascular: Negative.  Negative for chest pain.   Gastrointestinal: Negative.  Negative for abdominal pain and blood in stool.   Endocrine: Negative.    Genitourinary: Negative.  Negative for difficulty urinating and dysuria.   Musculoskeletal: Negative.    Skin: Negative.    Allergic/Immunologic: Negative.    Neurological: Negative.  Negative for dizziness and headaches.   Hematological: Negative.    Psychiatric/Behavioral: Negative.  Negative for dysphoric mood. The patient is not nervous/anxious.      Current Outpatient Medications   Medication Sig Dispense Refill    sertraline 50 MG Oral Tab Take 1 tablet (50 mg total) by mouth daily.       Allergies:Allergies[1]    Objective:   Physical Exam  Constitutional:       Appearance: Normal appearance. He is well-developed.   HENT:      Head: Normocephalic.      Right Ear: Tympanic membrane, ear canal and external ear normal.      Left Ear: Tympanic membrane, ear canal and external ear normal.      Nose: Nose normal.      Mouth/Throat:      Mouth: Mucous membranes are moist.      Pharynx: No oropharyngeal exudate or posterior oropharyngeal erythema.   Eyes:       Conjunctiva/sclera: Conjunctivae normal.   Cardiovascular:      Rate and Rhythm: Normal rate and regular rhythm.      Pulses: Normal pulses.      Heart sounds: Normal heart sounds.   Pulmonary:      Effort: Pulmonary effort is normal. No respiratory distress.      Breath sounds: Normal breath sounds. No wheezing or rales.   Abdominal:      General: Abdomen is flat. There is no distension.      Palpations: Abdomen is soft. There is no mass.      Tenderness: There is no abdominal tenderness.   Musculoskeletal:         General: Normal range of motion.      Cervical back: Normal range of motion and neck supple.   Skin:     General: Skin is warm.   Neurological:      General: No focal deficit present.      Mental Status: He is alert and oriented to person, place, and time.      Sensory: No sensory deficit.      Deep Tendon Reflexes: Reflexes are normal and symmetric. Reflexes normal.   Psychiatric:         Mood and Affect: Mood normal.         Behavior: Behavior normal.         Assessment & Plan:   1. Routine physical examination:  - Exam is unremarkable. Screening tests were discussed, and after discussion, will check lab work as below. Healthy diet, exercise, and weight were discussed. To call if problems and follow up and further management after testing. Routine follow up.      2. Anxiety:  - Stable, continue present management and follow up with psychiatry as planned and can transfer script as discussed.       Orders Placed This Encounter   Procedures    CBC, Platelet; No Differential    Comp Metabolic Panel (14)    Lipid Panel    Hemoglobin A1C       Meds This Visit:  Requested Prescriptions      No prescriptions requested or ordered in this encounter       Imaging & Referrals:  None         [1] No Known Allergies

## 2025-03-07 ENCOUNTER — LAB ENCOUNTER (OUTPATIENT)
Dept: LAB | Age: 40
End: 2025-03-07
Attending: FAMILY MEDICINE
Payer: COMMERCIAL

## 2025-03-07 DIAGNOSIS — Z00.00 ROUTINE PHYSICAL EXAMINATION: ICD-10-CM

## 2025-03-07 LAB
ALBUMIN SERPL-MCNC: 4.4 G/DL (ref 3.2–4.8)
ALBUMIN/GLOB SERPL: 1.5 {RATIO} (ref 1–2)
ALP LIVER SERPL-CCNC: 88 U/L
ALT SERPL-CCNC: 28 U/L
ANION GAP SERPL CALC-SCNC: 10 MMOL/L (ref 0–18)
AST SERPL-CCNC: 26 U/L (ref ?–34)
BILIRUB SERPL-MCNC: 0.5 MG/DL (ref 0.3–1.2)
BUN BLD-MCNC: 13 MG/DL (ref 9–23)
BUN/CREAT SERPL: 14.4 (ref 10–20)
CALCIUM BLD-MCNC: 8.8 MG/DL (ref 8.7–10.4)
CHLORIDE SERPL-SCNC: 102 MMOL/L (ref 98–112)
CHOLEST SERPL-MCNC: 215 MG/DL (ref ?–200)
CO2 SERPL-SCNC: 27 MMOL/L (ref 21–32)
CREAT BLD-MCNC: 0.9 MG/DL
DEPRECATED RDW RBC AUTO: 41.9 FL (ref 35.1–46.3)
EGFRCR SERPLBLD CKD-EPI 2021: 112 ML/MIN/1.73M2 (ref 60–?)
ERYTHROCYTE [DISTWIDTH] IN BLOOD BY AUTOMATED COUNT: 13.4 % (ref 11–15)
EST. AVERAGE GLUCOSE BLD GHB EST-MCNC: 114 MG/DL (ref 68–126)
FASTING PATIENT LIPID ANSWER: YES
FASTING STATUS PATIENT QL REPORTED: YES
GLOBULIN PLAS-MCNC: 2.9 G/DL (ref 2–3.5)
GLUCOSE BLD-MCNC: 87 MG/DL (ref 70–99)
HBA1C MFR BLD: 5.6 % (ref ?–5.7)
HCT VFR BLD AUTO: 42 %
HDLC SERPL-MCNC: 42 MG/DL (ref 40–59)
HGB BLD-MCNC: 14.3 G/DL
LDLC SERPL CALC-MCNC: 157 MG/DL (ref ?–100)
MCH RBC QN AUTO: 29.2 PG (ref 26–34)
MCHC RBC AUTO-ENTMCNC: 34 G/DL (ref 31–37)
MCV RBC AUTO: 85.9 FL
NONHDLC SERPL-MCNC: 173 MG/DL (ref ?–130)
OSMOLALITY SERPL CALC.SUM OF ELEC: 287 MOSM/KG (ref 275–295)
PLATELET # BLD AUTO: 319 10(3)UL (ref 150–450)
POTASSIUM SERPL-SCNC: 4.3 MMOL/L (ref 3.5–5.1)
PROT SERPL-MCNC: 7.3 G/DL (ref 5.7–8.2)
RBC # BLD AUTO: 4.89 X10(6)UL
SODIUM SERPL-SCNC: 139 MMOL/L (ref 136–145)
TRIGL SERPL-MCNC: 91 MG/DL (ref 30–149)
VLDLC SERPL CALC-MCNC: 17 MG/DL (ref 0–30)
WBC # BLD AUTO: 6.8 X10(3) UL (ref 4–11)

## 2025-03-07 PROCEDURE — 80053 COMPREHEN METABOLIC PANEL: CPT

## 2025-03-07 PROCEDURE — 85027 COMPLETE CBC AUTOMATED: CPT

## 2025-03-07 PROCEDURE — 83036 HEMOGLOBIN GLYCOSYLATED A1C: CPT

## 2025-03-07 PROCEDURE — 80061 LIPID PANEL: CPT

## 2025-03-07 PROCEDURE — 36415 COLL VENOUS BLD VENIPUNCTURE: CPT

## 2025-04-14 ENCOUNTER — TELEMEDICINE (OUTPATIENT)
Dept: TELEHEALTH | Age: 40
End: 2025-04-14
Payer: COMMERCIAL

## 2025-04-14 DIAGNOSIS — J01.90 ACUTE RHINOSINUSITIS: Primary | ICD-10-CM

## 2025-04-14 NOTE — PATIENT INSTRUCTIONS
Home Care    Take Amox-clav as prescribed with food.    Flonase or Nasacort OTC for nasal symptoms as instructed  Follow up with your health care provider if your symptoms do not improve in 3-5 days.   Over the counter saline nasal spray may also help with congestion.  An expectorant with guaifenesin may help thin nasal mucus and help your sinuses drain fluids.  (Mucinex D contains both guaifenesin and sudafed).  Avoid use of antihistamines unless allergies contributed to your infection  Over-the-counter acetaminophen/Ibuprofen according to package instructions as needed for fever or pain.  If you have chronic liver or kidney disease or ever had a stomach ulcer, talk with your healthcare provider before using these medicines.   Cepacol lozenges can soothe your throat.   Humidify the air.  Steam inhalation and warm compresses often help relieve pressure  Drink plenty of water, hot tea, and other liquids. This may help thin nasal mucus. It also may help your sinuses drain fluids.    If you were prescribed an antibiotic:   Complete the entire prescription.  If you develop a rash, hives, itching, throat tightness, or shortness of breath while on the antibiotic or shortly after completion, please call your PCP immediately and stop your antibiotic.  This may be an allergic reaction.   Probiotics or yogurt daily during antibioitic use may help decrease stomach upset and restore good bacteria to the gut.  Take the probiotic at least 2 -3 hours after taking the antibiotic.  Examples include:  Yogurt: eat 4-8 oz twice daily.     Probiotics: Capsule/granule forms such as Florastor, Florajen (refrigerated), or Culturelle.      When to seek medical advice  Call your healthcare provider if any of these occur:  Facial pain or headache that gets worse  Stiff neck  Unusual drowsiness or confusion  Swelling of your forehead or eyelids  Vision problems, such as blurred or double vision  Fever of 100.4ºF (38ºC) or higher, or as  directed by your healthcare provider  Seizure  Breathing problems  Symptoms don't go away in 10 days

## 2025-04-14 NOTE — PROGRESS NOTES
Virtual/Telephone Check-In    Marcio Steve verbally consents to a Virtual/Telephone Check-In service on 04/14/25.  Patient has been referred to the [facility] website at [email or web address] to review the yearly Consent to Treat document.  Patient understands and accepts financial responsibility for any deductible, co-insurance and/or co-pays associated with this service.       Telehealth Verbal Consent   I conducted a telehealth visit with Marcio Steve today, 04/14/25, which was completed using two-way, real-time interactive audio and video communication. This has been done in good emily to provide continuity of care in the best interest of the provider-patient relationship, due to the COVID -19 public health crisis/national emergency where restrictions of face-to-face office visits are ongoing. Every conscious effort was taken to allow for sufficient and adequate time to complete the visit.  The patient was made aware of the limitations of the telehealth visit, including treatment limitations as no physical exam could be performed.  The patient was advised to call 911 or to go to the ER in case there was an emergency.  The patient was also advised of the potential privacy & security concerns related to the telehealth platform.   The patient was made aware of where to find [facility]'s notice of privacy practices, telehealth consent form and other related consent forms and documents.  which are located on the [facility] website. The patient verbally agreed to telehealth consent form, related consents and the risks discussed.    Lastly, the patient confirmed that they were in [place].   Included in this visit, time may have been spent reviewing labs, medications, radiology tests and decision making. Appropriate medical decision-making and tests are ordered as detailed in the plan of care above.  Coding/billing information is submitted for this visit based on complexity of care and/or time spent for the visit.    CHIEF  COMPLAINT:  Chief Complaint   Patient presents with    Sinus Problem       HPI:  Marcio Steve is a 40 year old male who presents for a video visit.  Pt presents for cold symptoms for over one week. Symptoms have progressed into sinus congestion and been worsening after a period of improvement. Sinus congestion/pain is described as a pressure and is located mainly forehead and cheeks.  Reports thick discolored nasal discharge.  Nothing makes symptoms better. Has treated symptoms with Mucinex with no relief.  Patient also reports sinus headache, L sided ear pain, sore throat that comes and goes, low grade fever of 99F, prior history of sinusitis.  Denies fever, cough, dental pain, tinnitus, N/V/D.      Past Medical History[1]  Past Surgical History[2]    Short Social Hx on File[3]     REVIEW OF SYSTEMS:  GENERAL: ok appetite  SKIN: no rashes or abnormal skin lesions  HEENT: See HPI  LUNGS: denies shortness of breath or wheezing, See HPI  NEURO: + sinus headaches    EXAM:  General: Alert, Well-appearing, and In no acute distress  Respiratory:   Speaking in full sentences comfortably  Normal work of breathing  No cough during visit  Head: Normocephalic, pressure and discomfort to frontal and maxillary sinuses with palpation per pt  Nose: No obvious nasal discharge.  Skin: No obvious rashes or lesions from what observed.     No results found for this or any previous visit (from the past 24 hours).    ASSESSMENT AND PLAN:  Marcio Steve is a 40 year old male who presents with symptoms that are consistent with    ASSESSMENT:   Encounter Diagnosis   Name Primary?    Acute rhinosinusitis Yes       PLAN: Meds as below.  See patient Instructions    Meds & Refills for this Visit:  Requested Prescriptions     Signed Prescriptions Disp Refills    amoxicillin clavulanate 875-125 MG Oral Tab 14 tablet 0     Sig: Take 1 tablet by mouth 2 (two) times daily for 7 days.       Risks, benefits, and side effects of medication  explained and discussed.    Patient Instructions   Home Care    Take Amox-clav as prescribed with food.    Flonase or Nasacort OTC for nasal symptoms as instructed  Follow up with your health care provider if your symptoms do not improve in 3-5 days.   Over the counter saline nasal spray may also help with congestion.  An expectorant with guaifenesin may help thin nasal mucus and help your sinuses drain fluids.  (Mucinex D contains both guaifenesin and sudafed).  Avoid use of antihistamines unless allergies contributed to your infection  Over-the-counter acetaminophen/Ibuprofen according to package instructions as needed for fever or pain.  If you have chronic liver or kidney disease or ever had a stomach ulcer, talk with your healthcare provider before using these medicines.   Cepacol lozenges can soothe your throat.   Humidify the air.  Steam inhalation and warm compresses often help relieve pressure  Drink plenty of water, hot tea, and other liquids. This may help thin nasal mucus. It also may help your sinuses drain fluids.    If you were prescribed an antibiotic:   Complete the entire prescription.  If you develop a rash, hives, itching, throat tightness, or shortness of breath while on the antibiotic or shortly after completion, please call your PCP immediately and stop your antibiotic.  This may be an allergic reaction.   Probiotics or yogurt daily during antibioitic use may help decrease stomach upset and restore good bacteria to the gut.  Take the probiotic at least 2 -3 hours after taking the antibiotic.  Examples include:  Yogurt: eat 4-8 oz twice daily.     Probiotics: Capsule/granule forms such as Florastor, Florajen (refrigerated), or Culturelle.      When to seek medical advice  Call your healthcare provider if any of these occur:  Facial pain or headache that gets worse  Stiff neck  Unusual drowsiness or confusion  Swelling of your forehead or eyelids  Vision problems, such as blurred or double  vision  Fever of 100.4ºF (38ºC) or higher, or as directed by your healthcare provider  Seizure  Breathing problems  Symptoms don't go away in 10 days      The patient indicates understanding of these issues and agrees to the plan.  The patient is asked to f/u with PCP if sx's persist or worsen.    Marcio Steve understands video visit evaluation is not a substitute for face-to-face examination or emergency care. Patient advised to go to ER or call 911 for worsening symptoms or acute distress.           [1]   Past Medical History:   Anxiety    Patient denies medical problems   [2]   Past Surgical History:  Procedure Laterality Date    Other      septoplasty- Dr Armas 5/22/19   [3]   Social History  Socioeconomic History    Marital status:    Tobacco Use    Smoking status: Never    Smokeless tobacco: Never   Vaping Use    Vaping status: Never Used   Substance and Sexual Activity    Alcohol use: Yes     Comment: occ.    Drug use: No     Social Drivers of Health      Received from [facility]    Housing Stability

## 2025-04-25 ENCOUNTER — PATIENT MESSAGE (OUTPATIENT)
Dept: FAMILY MEDICINE CLINIC | Facility: CLINIC | Age: 40
End: 2025-04-25

## 2025-04-28 NOTE — TELEPHONE ENCOUNTER
Message noted: Chart reviewed and may refill medication as requested. Prescription sent to listed pharmacy. Pharmacy to notify patient. Pt notified through [employer]

## 2025-05-24 ENCOUNTER — TELEMEDICINE (OUTPATIENT)
Dept: TELEHEALTH | Age: 40
End: 2025-05-24
Payer: COMMERCIAL

## 2025-05-24 DIAGNOSIS — J01.40 ACUTE NON-RECURRENT PANSINUSITIS: Primary | ICD-10-CM

## 2025-05-24 NOTE — PROGRESS NOTES
Virtual/Telephone Check-In    Marcio Steve verbally consents to a Virtual/Telephone Check-In service on 05/24/25.  Patient has been referred to the [facility] website at [email or web address] to review the yearly Consent to Treat document.  Patient understands and accepts financial responsibility for any deductible, co-insurance and/or co-pays associated with this service.       Telehealth Verbal Consent   I conducted a telehealth visit with Marcio Steve today, 05/24/25, which was completed using two-way, real-time interactive audio and video communication. This has been done in good emily to provide continuity of care in the best interest of the provider-patient relationship, due to the COVID -19 public health crisis/national emergency where restrictions of face-to-face office visits are ongoing. Every conscious effort was taken to allow for sufficient and adequate time to complete the visit.  The patient was made aware of the limitations of the telehealth visit, including treatment limitations as no physical exam could be performed.  The patient was advised to call 911 or to go to the ER in case there was an emergency.  The patient was also advised of the potential privacy & security concerns related to the telehealth platform.   The patient was made aware of where to find [facility]'s notice of privacy practices, telehealth consent form and other related consent forms and documents.  which are located on the [facility] website. The patient verbally agreed to telehealth consent form, related consents and the risks discussed.    Lastly, the patient confirmed that they were in [place].   Included in this visit, time may have been spent reviewing labs, medications, radiology tests and decision making. Appropriate medical decision-making and tests are ordered as detailed in the plan of care above.  Coding/billing information is submitted for this visit based on complexity of care and/or time spent for the visit.    CHIEF  COMPLAINT:  Chief Complaint   Patient presents with    Sinus Problem       HPI:  Marcio Steve is a 40 year old male who presents for a video visit.  Patient reports Sinus symptoms, congestion, pressure, ear popping/pressure.  Reports long hx of sinus infection, had sinus surgery several years ago.  Believes got cold from children, started to get better after 5-6 days, but then past couple days much worse again.  Patient denies wheezing/sob.  Patient has tried Claritin, nasal irrigation for symptoms, which has slightly seemed to help for short periods of time.     Current Medications[1]  Past Medical History[2]  Past Surgical History[3]    Short Social Hx on File[4]     REVIEW OF SYSTEMS:  GENERAL: ok appetite  SKIN: no rashes or abnormal skin lesions  HEENT: See HPI  LUNGS: denies shortness of breath or wheezing, See HPI  CARDIOVASCULAR: denies chest pain or palpitations   GI: denies N/V/C or abdominal pain  NEURO: Denies headaches    EXAM:  General: Alert and In no acute distress  Respiratory:   Speaking in full sentences comfortably  Normal work of breathing  No cough during visit  Head: Normocephalic  Nose: No obvious nasal discharge.  Skin: No obvious rashes or lesions from what observed.     No results found for this or any previous visit (from the past 24 hours).    ASSESSMENT AND PLAN:  Marcio Steve is a 40 year old male who presents with symptoms that are consistent with    ASSESSMENT:   Encounter Diagnosis   Name Primary?    Acute non-recurrent pansinusitis Yes       PLAN: Meds as below.  See patient Instructions    Meds & Refills for this Visit:  Requested Prescriptions     Signed Prescriptions Disp Refills    amoxicillin clavulanate 875-125 MG Oral Tab 14 tablet 0     Sig: Take 1 tablet by mouth 2 (two) times daily for 7 days.       Risks, benefits, and side effects of medication explained and discussed.    There are no Patient Instructions on file for this visit.    The patient indicates  understanding of these issues and agrees to the plan.  The patient is asked to return if sx's persist or worsen.    Marcio Steve understands video visit evaluation is not a substitute for face-to-face examination or emergency care. Patient advised to go to ER or call 911 for worsening symptoms or acute distress.            [1]   Current Outpatient Medications   Medication Sig Dispense Refill    amoxicillin clavulanate 875-125 MG Oral Tab Take 1 tablet by mouth 2 (two) times daily for 7 days. 14 tablet 0    sertraline 50 MG Oral Tab Take 1 tablet (50 mg total) by mouth daily. 90 tablet 1   [2]   Past Medical History:   Anxiety    Patient denies medical problems   [3]   Past Surgical History:  Procedure Laterality Date    Other      septoplasty- Dr Armas 5/22/19   [4]   Social History  Socioeconomic History    Marital status:    Tobacco Use    Smoking status: Never    Smokeless tobacco: Never   Vaping Use    Vaping status: Never Used   Substance and Sexual Activity    Alcohol use: Yes     Comment: occ.    Drug use: No     Social Drivers of Health      Received from [facility]    Housing Stability

## 2025-05-31 ENCOUNTER — PATIENT MESSAGE (OUTPATIENT)
Dept: FAMILY MEDICINE CLINIC | Facility: CLINIC | Age: 40
End: 2025-05-31

## 2025-05-31 RX ORDER — AZITHROMYCIN 250 MG/1
TABLET, FILM COATED ORAL
Qty: 6 TABLET | Refills: 0 | Status: SHIPPED | OUTPATIENT
Start: 2025-05-31 | End: 2025-06-05

## 2025-05-31 NOTE — TELEPHONE ENCOUNTER
Message noted. May start zpak as requested. Erx sent to listed pharmacy. To follow up for appointment if not better; Pt notified through [employer]

## 2025-06-16 ENCOUNTER — HOSPITAL ENCOUNTER (OUTPATIENT)
Age: 40
Discharge: HOME | End: 2025-06-16
Payer: COMMERCIAL

## 2025-06-16 VITALS
SYSTOLIC BLOOD PRESSURE: 129 MMHG | RESPIRATION RATE: 20 BRPM | HEART RATE: 76 BPM | TEMPERATURE: 98 F | DIASTOLIC BLOOD PRESSURE: 88 MMHG | OXYGEN SATURATION: 100 %

## 2025-06-16 DIAGNOSIS — J03.90 EXUDATIVE TONSILLITIS: Primary | ICD-10-CM

## 2025-06-16 DIAGNOSIS — R10.9 ABDOMINAL CRAMPING: ICD-10-CM

## 2025-06-16 DIAGNOSIS — R19.7 DIARRHEA, UNSPECIFIED TYPE: ICD-10-CM

## 2025-06-16 LAB — S PYO AG THROAT QL: NEGATIVE

## 2025-06-16 PROCEDURE — 87880 STREP A ASSAY W/OPTIC: CPT | Performed by: PHYSICIAN ASSISTANT

## 2025-06-16 PROCEDURE — 99213 OFFICE O/P EST LOW 20 MIN: CPT | Performed by: PHYSICIAN ASSISTANT

## 2025-06-16 RX ORDER — METHYLPREDNISOLONE 4 MG/1
TABLET ORAL
Qty: 1 EACH | Refills: 0 | Status: SHIPPED | OUTPATIENT
Start: 2025-06-16

## 2025-06-16 RX ORDER — DICYCLOMINE HCL 20 MG
20 TABLET ORAL 3 TIMES DAILY PRN
Qty: 30 TABLET | Refills: 0 | Status: SHIPPED | OUTPATIENT
Start: 2025-06-16

## 2025-06-16 NOTE — ED INITIAL ASSESSMENT (HPI)
Pt with generalized abdominal pain since Thursday, sore throat since Saturday, fever on Saturday, and chills yesterday. Pt reports diarrhea and nausea, denies vomiting.

## 2025-06-16 NOTE — ED PROVIDER NOTES
Patient Seen in: [facility]        History  Chief Complaint   Patient presents with    Sore Throat    Abdominal Pain     Stated Complaint: sore throat/pain, abdominal pain x 4 days    Subjective:   HPI            Patient is a 40-year-old male with allergic rhinitis, sinusitis, deviated septum status sinus surgery, anxiety, non-smoker, presenting to immediate care for evaluation of sore throat.  Onset: 4 days.  Initial subjective fevers with chills.  Associated sore throat with pain with swallowing.  Enlarged tonsil on the right side with white spots.  In addition has been having intermittent abdominal cramping with nonbloody diarrhea.  Recently coming back from [place].  Recently treated for sinus infection 2 weeks ago with initial Augmentin followed by azithromycin given no improvement of symptoms.  Currently on Allegra for allergies.  No current fevers.  No current nausea or vomiting.  No current abdominal pain.  Nonbloody diarrhea.  Not immunocompromised      Objective:     Past Medical History:    Anxiety    Patient denies medical problems              Past Surgical History:   Procedure Laterality Date    Other      septoplasty- Dr Armas 5/22/19                Social History     Socioeconomic History    Marital status:    Tobacco Use    Smoking status: Never    Smokeless tobacco: Never   Vaping Use    Vaping status: Never Used   Substance and Sexual Activity    Alcohol use: Yes     Comment: occ.    Drug use: No     Social Drivers of Health      Received from [facility]    Housing Stability              Review of Systems   Constitutional:  Negative for fever.   HENT:  Positive for congestion, postnasal drip and sore throat. Negative for ear pain, trouble swallowing and voice change.    Respiratory:  Negative for shortness of breath.    Cardiovascular:  Negative for chest pain.   Gastrointestinal:  Positive for abdominal pain and diarrhea. Negative for constipation, nausea  and vomiting.   Musculoskeletal:  Negative for back pain, gait problem, joint swelling, neck pain and neck stiffness.   Skin:  Negative for rash.   Allergic/Immunologic: Positive for environmental allergies. Negative for immunocompromised state.   Neurological:  Negative for dizziness, light-headedness and headaches.   Psychiatric/Behavioral:  Negative for confusion.    All other systems reviewed and are negative.      Positive for stated complaint: sore throat/pain, abdominal pain x 4 days  Other systems are as noted in HPI.  Constitutional and vital signs reviewed.      All other systems reviewed and negative except as noted above.                  Physical Exam    ED Triage Vitals [06/16/25 0821]   /88   Pulse 76   Resp 20   Temp 98.3 °F (36.8 °C)   Temp src Oral   SpO2 100 %   O2 Device None (Room air)       Current Vitals:   Vital Signs  BP: 129/88  Pulse: 76  Resp: 20  Temp: 98.3 °F (36.8 °C)  Temp src: Oral    Oxygen Therapy  SpO2: 100 %  O2 Device: None (Room air)            Physical Exam  Vitals and nursing note reviewed.   Constitutional:       General: He is not in acute distress.     Appearance: Normal appearance. He is well-developed. He is not ill-appearing, toxic-appearing or diaphoretic.   HENT:      Head: Normocephalic and atraumatic.      Right Ear: Tympanic membrane normal.      Left Ear: Tympanic membrane normal.      Nose: Congestion present.      Mouth/Throat:      Mouth: Mucous membranes are moist.      Pharynx: Posterior oropharyngeal erythema present.      Comments: Right tonsil 2+ erythematous with exudates.  No RPA or PTA.  Left tonsil 1+ erythematous without exudate.  Uvula is midline.  No trismus or drooling  Eyes:      Conjunctiva/sclera: Conjunctivae normal.   Neck:      Comments: No nuchal rigidity  Cardiovascular:      Rate and Rhythm: Normal rate.      Pulses: Normal pulses.   Pulmonary:      Effort: Pulmonary effort is normal. No respiratory distress.      Breath sounds:  Normal breath sounds.      Comments: Clear to auscultation bilaterally  Abdominal:      Comments: Soft nontender.   Musculoskeletal:         General: No deformity. Normal range of motion.      Cervical back: Normal range of motion. No rigidity.   Neurological:      General: No focal deficit present.      Mental Status: He is alert and oriented to person, place, and time.      Motor: No weakness.      Gait: Gait normal.   Psychiatric:         Mood and Affect: Mood normal.         Behavior: Behavior normal.           ED Course  Labs Reviewed   POCT RAPID STREP - Normal     Results for orders placed or performed during the hospital encounter of 06/16/25   POCT Rapid Strep    Collection Time: 06/16/25  8:34 AM   Result Value Ref Range    POCT Rapid Strep Negative Negative                 MDM    Differential diagnoses considered included, but are not exclusive of: viral upper respiratory infection, URI, pharyngitis, strep throat, tonsillitis, uvulitis, allergic rhinitis, etc.    Dx: Exudative Tonsillitis, Initial Encounter  Strep point-of-care Negative  No RPA, no PTA  No Cale's angina  Nontoxic-appearing  Well-appearing  Tolerating p.o.  No clinical signs dehydration  Outpatient management  Supportive care  OTC Motrin/Tylenol as needed for pain/fever  OTC Lozenges as needed for sore throat  Medrol dose pack for tonsillitis  We will treat for acute exudative/bacterial tonsillitis with oral   Augmentin twice daily for 10 days for bacterial tonsillitis   PCP  follow-up  Discharge instructions on tonsillitis  ED Return precautions          Medical Decision Making      Disposition and Plan     Clinical Impression:  1. Exudative tonsillitis    2. Abdominal cramping    3. Diarrhea, unspecified type         Disposition:  Discharge  6/16/2025  8:46 am    Follow-up:  No follow-up provider specified.        Medications Prescribed:  Discharge Medication List as of 6/16/2025  8:46 AM        START taking these medications     Details   methylPREDNISolone (MEDROL) 4 MG Oral Tablet Therapy Pack Dosepack: take as directed, Normal, Disp-1 each, R-0      dicyclomine 20 MG Oral Tab Take 1 tablet (20 mg total) by mouth 3 (three) times daily as needed (abdominal cramping)., Normal, Disp-30 tablet, R-0                   Supplementary Documentation:

## (undated) NOTE — ED AVS SNAPSHOT
Rober Emmanuel   MRN: [id number]    Department:  [facility] Emergency Department   Date of Visit:  6/14/2019           Disclosure     Insurance plans vary and the physician(s) referred by the ER may not be covered by your plan.  Please contact CARE PHYSICIAN AT ONCE OR RETURN IMMEDIATELY TO THE EMERGENCY DEPARTMENT. If you have been prescribed any medication(s), please fill your prescription right away and begin taking the medication(s) as directed.   If you believe that any of the medications

## (undated) NOTE — LETTER
[facility]  [address] [place] [id number]       03/09/19        Patient: Quoc Slade   YOB: 1985   Date of Visit: 3/9/2019       Dear  Dr. Tonya Cisneros MD,      Thank you for referring Quoc Slade to my practice.   Ple

## (undated) NOTE — LETTER
Date & Time: 12/3/2021, 2:52 PM  Patient: Yogesh Noland  Encounter Provider(s):    RADHA Mcmanus       To Whom It May Concern:    Yogesh Noland was seen and treated in our department on 12/3/2021.  He should not return to work until [facility]